# Patient Record
Sex: FEMALE | Race: WHITE | NOT HISPANIC OR LATINO | URBAN - METROPOLITAN AREA
[De-identification: names, ages, dates, MRNs, and addresses within clinical notes are randomized per-mention and may not be internally consistent; named-entity substitution may affect disease eponyms.]

---

## 2017-04-12 ENCOUNTER — OUTPATIENT (OUTPATIENT)
Dept: OUTPATIENT SERVICES | Facility: HOSPITAL | Age: 67
LOS: 1 days | End: 2017-04-12
Payer: MEDICARE

## 2017-04-12 ENCOUNTER — APPOINTMENT (OUTPATIENT)
Dept: RADIOLOGY | Facility: CLINIC | Age: 67
End: 2017-04-12

## 2017-04-12 DIAGNOSIS — Z00.8 ENCOUNTER FOR OTHER GENERAL EXAMINATION: ICD-10-CM

## 2017-04-12 PROBLEM — Z00.00 ENCOUNTER FOR PREVENTIVE HEALTH EXAMINATION: Status: ACTIVE | Noted: 2017-04-12

## 2017-04-12 PROCEDURE — 73502 X-RAY EXAM HIP UNI 2-3 VIEWS: CPT

## 2017-06-08 ENCOUNTER — APPOINTMENT (OUTPATIENT)
Dept: RADIOLOGY | Facility: CLINIC | Age: 67
End: 2017-06-08

## 2017-06-08 ENCOUNTER — OUTPATIENT (OUTPATIENT)
Dept: OUTPATIENT SERVICES | Facility: HOSPITAL | Age: 67
LOS: 1 days | End: 2017-06-08
Payer: MEDICARE

## 2017-06-08 DIAGNOSIS — Z00.8 ENCOUNTER FOR OTHER GENERAL EXAMINATION: ICD-10-CM

## 2017-06-08 PROCEDURE — 71046 X-RAY EXAM CHEST 2 VIEWS: CPT

## 2017-09-27 ENCOUNTER — INPATIENT (INPATIENT)
Facility: HOSPITAL | Age: 67
LOS: 15 days | Discharge: HOME HEALTH | DRG: 949 | End: 2017-10-13
Attending: STUDENT IN AN ORGANIZED HEALTH CARE EDUCATION/TRAINING PROGRAM | Admitting: STUDENT IN AN ORGANIZED HEALTH CARE EDUCATION/TRAINING PROGRAM
Payer: MEDICARE

## 2017-09-27 DIAGNOSIS — M79.2 NEURALGIA AND NEURITIS, UNSPECIFIED: ICD-10-CM

## 2017-09-27 DIAGNOSIS — Z91.81 HISTORY OF FALLING: ICD-10-CM

## 2017-09-27 DIAGNOSIS — B37.0 CANDIDAL STOMATITIS: ICD-10-CM

## 2017-09-27 DIAGNOSIS — F41.8 OTHER SPECIFIED ANXIETY DISORDERS: ICD-10-CM

## 2017-09-27 DIAGNOSIS — L08.9 LOCAL INFECTION OF THE SKIN AND SUBCUTANEOUS TISSUE, UNSPECIFIED: ICD-10-CM

## 2017-09-27 DIAGNOSIS — I82.421 ACUTE EMBOLISM AND THROMBOSIS OF RIGHT ILIAC VEIN: ICD-10-CM

## 2017-09-27 DIAGNOSIS — M81.0 AGE-RELATED OSTEOPOROSIS WITHOUT CURRENT PATHOLOGICAL FRACTURE: ICD-10-CM

## 2017-09-27 DIAGNOSIS — R00.0 TACHYCARDIA, UNSPECIFIED: ICD-10-CM

## 2017-09-27 DIAGNOSIS — R26.9 UNSPECIFIED ABNORMALITIES OF GAIT AND MOBILITY: ICD-10-CM
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
## 2017-09-27 DIAGNOSIS — Z47.89 ENCOUNTER FOR OTHER ORTHOPEDIC AFTERCARE: ICD-10-CM

## 2017-09-27 DIAGNOSIS — I82.412 ACUTE EMBOLISM AND THROMBOSIS OF LEFT FEMORAL VEIN: ICD-10-CM

## 2017-09-27 DIAGNOSIS — M21.371 FOOT DROP, RIGHT FOOT: ICD-10-CM

## 2017-09-27 DIAGNOSIS — Z51.89 ENCOUNTER FOR OTHER SPECIFIED AFTERCARE: ICD-10-CM

## 2017-09-27 DIAGNOSIS — E78.5 HYPERLIPIDEMIA, UNSPECIFIED: ICD-10-CM

## 2017-09-27 DIAGNOSIS — Z98.1 ARTHRODESIS STATUS: ICD-10-CM

## 2017-09-27 DIAGNOSIS — T81.31XD DISRUPTION OF EXTERNAL OPERATION (SURGICAL) WOUND, NOT ELSEWHERE CLASSIFIED, SUBSEQUENT ENCOUNTER: ICD-10-CM

## 2017-09-27 DIAGNOSIS — Z79.899 OTHER LONG TERM (CURRENT) DRUG THERAPY: ICD-10-CM

## 2017-09-27 DIAGNOSIS — Z86.718 PERSONAL HISTORY OF OTHER VENOUS THROMBOSIS AND EMBOLISM: ICD-10-CM

## 2017-09-27 DIAGNOSIS — Z94.1 HEART TRANSPLANT STATUS: ICD-10-CM

## 2017-09-27 DIAGNOSIS — B96.89 OTHER SPECIFIED BACTERIAL AGENTS AS THE CAUSE OF DISEASES CLASSIFIED ELSEWHERE: ICD-10-CM
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
## 2017-09-27 DIAGNOSIS — D64.9 ANEMIA, UNSPECIFIED: ICD-10-CM

## 2017-09-27 DIAGNOSIS — Z87.891 PERSONAL HISTORY OF NICOTINE DEPENDENCE: ICD-10-CM

## 2017-09-27 DIAGNOSIS — M21.372 FOOT DROP, LEFT FOOT: ICD-10-CM

## 2017-09-28 PROCEDURE — 93010 ELECTROCARDIOGRAM REPORT: CPT

## 2017-09-28 PROCEDURE — 99223 1ST HOSP IP/OBS HIGH 75: CPT

## 2017-09-29 PROCEDURE — 99233 SBSQ HOSP IP/OBS HIGH 50: CPT

## 2017-09-29 PROCEDURE — 99222 1ST HOSP IP/OBS MODERATE 55: CPT

## 2017-09-30 PROCEDURE — 99232 SBSQ HOSP IP/OBS MODERATE 35: CPT

## 2017-10-01 PROCEDURE — 99232 SBSQ HOSP IP/OBS MODERATE 35: CPT

## 2017-10-02 PROCEDURE — 99233 SBSQ HOSP IP/OBS HIGH 50: CPT

## 2017-10-03 PROCEDURE — 99232 SBSQ HOSP IP/OBS MODERATE 35: CPT

## 2017-10-03 PROCEDURE — 99233 SBSQ HOSP IP/OBS HIGH 50: CPT

## 2017-10-04 PROCEDURE — 99232 SBSQ HOSP IP/OBS MODERATE 35: CPT

## 2017-10-05 PROCEDURE — 99233 SBSQ HOSP IP/OBS HIGH 50: CPT

## 2017-10-05 PROCEDURE — 99232 SBSQ HOSP IP/OBS MODERATE 35: CPT

## 2017-10-06 PROCEDURE — 99232 SBSQ HOSP IP/OBS MODERATE 35: CPT

## 2017-10-07 PROCEDURE — 99232 SBSQ HOSP IP/OBS MODERATE 35: CPT | Mod: GC

## 2017-10-08 PROCEDURE — 99232 SBSQ HOSP IP/OBS MODERATE 35: CPT | Mod: GC

## 2017-10-09 PROCEDURE — 93970 EXTREMITY STUDY: CPT | Mod: 26

## 2017-10-09 PROCEDURE — 99233 SBSQ HOSP IP/OBS HIGH 50: CPT

## 2017-10-09 PROCEDURE — 99232 SBSQ HOSP IP/OBS MODERATE 35: CPT

## 2017-10-10 PROCEDURE — 99233 SBSQ HOSP IP/OBS HIGH 50: CPT

## 2017-10-10 PROCEDURE — 99232 SBSQ HOSP IP/OBS MODERATE 35: CPT

## 2017-10-11 PROCEDURE — 99232 SBSQ HOSP IP/OBS MODERATE 35: CPT

## 2017-10-11 PROCEDURE — 72100 X-RAY EXAM L-S SPINE 2/3 VWS: CPT | Mod: 26

## 2017-10-11 PROCEDURE — 99233 SBSQ HOSP IP/OBS HIGH 50: CPT

## 2017-10-12 PROCEDURE — 99233 SBSQ HOSP IP/OBS HIGH 50: CPT

## 2017-10-12 PROCEDURE — 99232 SBSQ HOSP IP/OBS MODERATE 35: CPT

## 2017-10-13 PROCEDURE — 99233 SBSQ HOSP IP/OBS HIGH 50: CPT

## 2017-10-13 PROCEDURE — 99238 HOSP IP/OBS DSCHRG MGMT 30/<: CPT

## 2017-12-19 PROCEDURE — 97116 GAIT TRAINING THERAPY: CPT

## 2017-12-19 PROCEDURE — 80053 COMPREHEN METABOLIC PANEL: CPT

## 2017-12-19 PROCEDURE — 84100 ASSAY OF PHOSPHORUS: CPT

## 2017-12-19 PROCEDURE — 93005 ELECTROCARDIOGRAM TRACING: CPT

## 2017-12-19 PROCEDURE — 80048 BASIC METABOLIC PNL TOTAL CA: CPT

## 2017-12-19 PROCEDURE — 80069 RENAL FUNCTION PANEL: CPT

## 2017-12-19 PROCEDURE — 87070 CULTURE OTHR SPECIMN AEROBIC: CPT

## 2017-12-19 PROCEDURE — 85027 COMPLETE CBC AUTOMATED: CPT

## 2017-12-19 PROCEDURE — 97163 PT EVAL HIGH COMPLEX 45 MIN: CPT

## 2017-12-19 PROCEDURE — 97530 THERAPEUTIC ACTIVITIES: CPT

## 2017-12-19 PROCEDURE — 97535 SELF CARE MNGMENT TRAINING: CPT

## 2017-12-19 PROCEDURE — 85025 COMPLETE CBC W/AUTO DIFF WBC: CPT

## 2017-12-19 PROCEDURE — 80197 ASSAY OF TACROLIMUS: CPT

## 2017-12-19 PROCEDURE — 83735 ASSAY OF MAGNESIUM: CPT

## 2017-12-19 PROCEDURE — 72100 X-RAY EXAM L-S SPINE 2/3 VWS: CPT

## 2017-12-19 PROCEDURE — 97167 OT EVAL HIGH COMPLEX 60 MIN: CPT

## 2017-12-19 PROCEDURE — 93970 EXTREMITY STUDY: CPT

## 2017-12-19 PROCEDURE — 97110 THERAPEUTIC EXERCISES: CPT

## 2017-12-19 PROCEDURE — 94760 N-INVAS EAR/PLS OXIMETRY 1: CPT

## 2018-12-14 ENCOUNTER — OUTPATIENT (OUTPATIENT)
Dept: OUTPATIENT SERVICES | Facility: HOSPITAL | Age: 68
LOS: 1 days | End: 2018-12-14
Payer: MEDICARE

## 2018-12-14 ENCOUNTER — APPOINTMENT (OUTPATIENT)
Dept: MRI IMAGING | Facility: CLINIC | Age: 68
End: 2018-12-14
Payer: MEDICARE

## 2018-12-14 DIAGNOSIS — M23.92 UNSPECIFIED INTERNAL DERANGEMENT OF LEFT KNEE: ICD-10-CM

## 2018-12-14 PROCEDURE — 73721 MRI JNT OF LWR EXTRE W/O DYE: CPT | Mod: 26,LT

## 2018-12-14 PROCEDURE — 73721 MRI JNT OF LWR EXTRE W/O DYE: CPT

## 2019-04-24 ENCOUNTER — OUTPATIENT (OUTPATIENT)
Dept: OUTPATIENT SERVICES | Facility: HOSPITAL | Age: 69
LOS: 1 days | End: 2019-04-24
Payer: MEDICARE

## 2019-04-24 ENCOUNTER — TRANSCRIPTION ENCOUNTER (OUTPATIENT)
Age: 69
End: 2019-04-24

## 2019-04-24 ENCOUNTER — APPOINTMENT (OUTPATIENT)
Dept: ULTRASOUND IMAGING | Facility: CLINIC | Age: 69
End: 2019-04-24
Payer: MEDICARE

## 2019-04-24 DIAGNOSIS — M25.572 PAIN IN LEFT ANKLE AND JOINTS OF LEFT FOOT: ICD-10-CM

## 2019-04-24 PROCEDURE — 93971 EXTREMITY STUDY: CPT | Mod: 26,LT

## 2019-04-24 PROCEDURE — 93971 EXTREMITY STUDY: CPT

## 2019-11-27 ENCOUNTER — TRANSCRIPTION ENCOUNTER (OUTPATIENT)
Age: 69
End: 2019-11-27

## 2020-11-23 ENCOUNTER — TRANSCRIPTION ENCOUNTER (OUTPATIENT)
Age: 70
End: 2020-11-23

## 2020-12-11 ENCOUNTER — TRANSCRIPTION ENCOUNTER (OUTPATIENT)
Age: 70
End: 2020-12-11

## 2022-10-25 ENCOUNTER — APPOINTMENT (OUTPATIENT)
Dept: ORTHOPEDIC SURGERY | Facility: CLINIC | Age: 72
End: 2022-10-25

## 2022-10-25 DIAGNOSIS — M16.11 UNILATERAL PRIMARY OSTEOARTHRITIS, RIGHT HIP: ICD-10-CM

## 2022-10-25 DIAGNOSIS — M54.9 DORSALGIA, UNSPECIFIED: ICD-10-CM

## 2022-10-25 DIAGNOSIS — M70.61 TROCHANTERIC BURSITIS, RIGHT HIP: ICD-10-CM

## 2022-10-25 DIAGNOSIS — Z98.1 ARTHRODESIS STATUS: ICD-10-CM

## 2022-10-25 PROCEDURE — 99214 OFFICE O/P EST MOD 30 MIN: CPT

## 2022-10-25 PROCEDURE — 99072 ADDL SUPL MATRL&STAF TM PHE: CPT

## 2022-10-25 PROCEDURE — 72100 X-RAY EXAM L-S SPINE 2/3 VWS: CPT

## 2022-10-25 PROCEDURE — 73502 X-RAY EXAM HIP UNI 2-3 VIEWS: CPT | Mod: RT

## 2022-10-25 RX ORDER — METHYLPREDNISOLONE 4 MG/1
4 TABLET ORAL
Qty: 1 | Refills: 1 | Status: ACTIVE | COMMUNITY
Start: 2022-10-25 | End: 1900-01-01

## 2022-10-25 NOTE — HISTORY OF PRESENT ILLNESS
[Right Leg] : right leg [Gradual] : gradual [Sudden] : sudden [6] : 6 [5] : 5 [Burning] : burning [Shooting] : shooting [Stabbing] : stabbing [Intermittent] : intermittent [Rest] : rest [Exercising] : exercising [Retired] : Work status: retired [de-identified] : 10/25/22 Return visit for this 71 year female c/o spon. onset of rt lateral hip pain x last 4 days duration. Limping. Constant and daily. has been using a shilo walker due to her pain. No hx of trauma. Tried tylenol prn w/o relief. Cannot take nsaids.\par \par PMH: HEART [employer]ANSPL[employer] PATIENT x 22 years ago.  Was on high dose prednisone daily for 2-3 years.  40 mg daily.\par          Also previous back surgery with hardware.  [] : Post Surgical Visit: no [FreeTextEntry1] : right hip  [FreeTextEntry5] : Pt has been having right hip pain that shoots down the leg, pt had been seen for her hip and was told she needed surgery and she hads an appointment for a surgical consultation  [de-identified] : none

## 2022-10-25 NOTE — PHYSICAL EXAM
[Normal Mood and Affect] : normal mood and affect [Able to Communicate] : able to communicate [Well Developed] : well developed [Well Nourished] : well nourished [Extension] : extension [Right] : right hip with pelvis [AP] : anteroposterior [Lateral] : lateral [Severe arthritis (Tonnis Grade 3)] : Severe arthritis (Tonnis Grade 3) [Fusion intact] : Fusion intact [FreeTextEntry9] : Rotating the right hip causes pain.  [FreeTextEntry1] : Lumbar fusion pedicle screws L3-S1, interbody cage L4-S1.  Intact. [de-identified] : extension 20 degrees [] : no pain with flexion and external rotation [de-identified] : Very pronounced antalgic gait. [TWNoteComboBox7] : flexion 100 degrees [de-identified] : adduction 20 degrees [de-identified] : abduction 20 degrees [de-identified] : external rotation 30 degrees [TWNoteComboBox6] : internal rotation 0 degrees

## 2023-12-30 ENCOUNTER — NON-APPOINTMENT (OUTPATIENT)
Age: 73
End: 2023-12-30

## 2024-03-01 ENCOUNTER — INPATIENT (INPATIENT)
Facility: HOSPITAL | Age: 74
LOS: 4 days | Discharge: HOME | DRG: 392 | End: 2024-03-06
Attending: INTERNAL MEDICINE | Admitting: INTERNAL MEDICINE
Payer: MEDICARE

## 2024-03-01 VITALS
WEIGHT: 199.96 LBS | HEART RATE: 109 BPM | DIASTOLIC BLOOD PRESSURE: 80 MMHG | HEIGHT: 65 IN | TEMPERATURE: 99 F | SYSTOLIC BLOOD PRESSURE: 126 MMHG | RESPIRATION RATE: 16 BRPM | OXYGEN SATURATION: 98 %

## 2024-03-01 DIAGNOSIS — Z94.1 HEART TRANSPLANT STATUS: Chronic | ICD-10-CM

## 2024-03-01 DIAGNOSIS — Z98.1 ARTHRODESIS STATUS: Chronic | ICD-10-CM

## 2024-03-01 LAB
ALBUMIN SERPL ELPH-MCNC: 2.8 G/DL — LOW (ref 3.3–5)
ALP SERPL-CCNC: 61 U/L — SIGNIFICANT CHANGE UP (ref 30–120)
ALT FLD-CCNC: 15 U/L — SIGNIFICANT CHANGE UP (ref 10–60)
ANION GAP SERPL CALC-SCNC: 8 MMOL/L — SIGNIFICANT CHANGE UP (ref 5–17)
APPEARANCE UR: CLEAR — SIGNIFICANT CHANGE UP
AST SERPL-CCNC: 20 U/L — SIGNIFICANT CHANGE UP (ref 10–40)
BASOPHILS # BLD AUTO: 0.04 K/UL — SIGNIFICANT CHANGE UP (ref 0–0.2)
BASOPHILS NFR BLD AUTO: 0.3 % — SIGNIFICANT CHANGE UP (ref 0–2)
BILIRUB SERPL-MCNC: 0.7 MG/DL — SIGNIFICANT CHANGE UP (ref 0.2–1.2)
BILIRUB UR-MCNC: NEGATIVE — SIGNIFICANT CHANGE UP
BUN SERPL-MCNC: 19 MG/DL — SIGNIFICANT CHANGE UP (ref 7–23)
CALCIUM SERPL-MCNC: 9.3 MG/DL — SIGNIFICANT CHANGE UP (ref 8.4–10.5)
CHLORIDE SERPL-SCNC: 96 MMOL/L — SIGNIFICANT CHANGE UP (ref 96–108)
CO2 SERPL-SCNC: 28 MMOL/L — SIGNIFICANT CHANGE UP (ref 22–31)
COLOR SPEC: YELLOW — SIGNIFICANT CHANGE UP
CREAT SERPL-MCNC: 1.19 MG/DL — SIGNIFICANT CHANGE UP (ref 0.5–1.3)
DIFF PNL FLD: NEGATIVE — SIGNIFICANT CHANGE UP
EGFR: 48 ML/MIN/1.73M2 — LOW
EOSINOPHIL # BLD AUTO: 0.06 K/UL — SIGNIFICANT CHANGE UP (ref 0–0.5)
EOSINOPHIL NFR BLD AUTO: 0.4 % — SIGNIFICANT CHANGE UP (ref 0–6)
GLUCOSE SERPL-MCNC: 127 MG/DL — HIGH (ref 70–99)
GLUCOSE UR QL: NEGATIVE MG/DL — SIGNIFICANT CHANGE UP
HCT VFR BLD CALC: 33.5 % — LOW (ref 34.5–45)
HGB BLD-MCNC: 10.8 G/DL — LOW (ref 11.5–15.5)
IMM GRANULOCYTES NFR BLD AUTO: 1.3 % — HIGH (ref 0–0.9)
KETONES UR-MCNC: NEGATIVE MG/DL — SIGNIFICANT CHANGE UP
LEUKOCYTE ESTERASE UR-ACNC: NEGATIVE — SIGNIFICANT CHANGE UP
LIDOCAIN IGE QN: 13 U/L — LOW (ref 16–77)
LYMPHOCYTES # BLD AUTO: 0.71 K/UL — LOW (ref 1–3.3)
LYMPHOCYTES # BLD AUTO: 4.7 % — LOW (ref 13–44)
MCHC RBC-ENTMCNC: 28.6 PG — SIGNIFICANT CHANGE UP (ref 27–34)
MCHC RBC-ENTMCNC: 32.2 GM/DL — SIGNIFICANT CHANGE UP (ref 32–36)
MCV RBC AUTO: 88.9 FL — SIGNIFICANT CHANGE UP (ref 80–100)
MONOCYTES # BLD AUTO: 1.05 K/UL — HIGH (ref 0–0.9)
MONOCYTES NFR BLD AUTO: 6.9 % — SIGNIFICANT CHANGE UP (ref 2–14)
NEUTROPHILS # BLD AUTO: 13.09 K/UL — HIGH (ref 1.8–7.4)
NEUTROPHILS NFR BLD AUTO: 86.4 % — HIGH (ref 43–77)
NITRITE UR-MCNC: NEGATIVE — SIGNIFICANT CHANGE UP
NRBC # BLD: 0 /100 WBCS — SIGNIFICANT CHANGE UP (ref 0–0)
PH UR: 7 — SIGNIFICANT CHANGE UP (ref 5–8)
PLATELET # BLD AUTO: 309 K/UL — SIGNIFICANT CHANGE UP (ref 150–400)
POTASSIUM SERPL-MCNC: 4.7 MMOL/L — SIGNIFICANT CHANGE UP (ref 3.5–5.3)
POTASSIUM SERPL-SCNC: 4.7 MMOL/L — SIGNIFICANT CHANGE UP (ref 3.5–5.3)
PROT SERPL-MCNC: 6.6 G/DL — SIGNIFICANT CHANGE UP (ref 6–8.3)
PROT UR-MCNC: NEGATIVE MG/DL — SIGNIFICANT CHANGE UP
RBC # BLD: 3.77 M/UL — LOW (ref 3.8–5.2)
RBC # FLD: 16.4 % — HIGH (ref 10.3–14.5)
SODIUM SERPL-SCNC: 132 MMOL/L — LOW (ref 135–145)
SP GR SPEC: 1.01 — SIGNIFICANT CHANGE UP (ref 1–1.03)
UROBILINOGEN FLD QL: 0.2 MG/DL — SIGNIFICANT CHANGE UP (ref 0.2–1)
WBC # BLD: 15.15 K/UL — HIGH (ref 3.8–10.5)
WBC # FLD AUTO: 15.15 K/UL — HIGH (ref 3.8–10.5)

## 2024-03-01 PROCEDURE — 99285 EMERGENCY DEPT VISIT HI MDM: CPT

## 2024-03-01 PROCEDURE — 74177 CT ABD & PELVIS W/CONTRAST: CPT | Mod: 26,MC

## 2024-03-01 RX ORDER — SODIUM CHLORIDE 9 MG/ML
1000 INJECTION INTRAMUSCULAR; INTRAVENOUS; SUBCUTANEOUS ONCE
Refills: 0 | Status: COMPLETED | OUTPATIENT
Start: 2024-03-01 | End: 2024-03-01

## 2024-03-01 RX ORDER — MORPHINE SULFATE 50 MG/1
4 CAPSULE, EXTENDED RELEASE ORAL ONCE
Refills: 0 | Status: DISCONTINUED | OUTPATIENT
Start: 2024-03-01 | End: 2024-03-01

## 2024-03-01 RX ORDER — ONDANSETRON 8 MG/1
4 TABLET, FILM COATED ORAL ONCE
Refills: 0 | Status: COMPLETED | OUTPATIENT
Start: 2024-03-01 | End: 2024-03-01

## 2024-03-01 RX ORDER — IOHEXOL 300 MG/ML
30 INJECTION, SOLUTION INTRAVENOUS ONCE
Refills: 0 | Status: COMPLETED | OUTPATIENT
Start: 2024-03-01 | End: 2024-03-01

## 2024-03-01 RX ORDER — ACETAMINOPHEN 500 MG
1000 TABLET ORAL ONCE
Refills: 0 | Status: COMPLETED | OUTPATIENT
Start: 2024-03-01 | End: 2024-03-01

## 2024-03-01 RX ADMIN — Medication 400 MILLIGRAM(S): at 19:37

## 2024-03-01 RX ADMIN — Medication 1000 MILLIGRAM(S): at 20:00

## 2024-03-01 RX ADMIN — ONDANSETRON 4 MILLIGRAM(S): 8 TABLET, FILM COATED ORAL at 21:35

## 2024-03-01 RX ADMIN — Medication 1000 MILLIGRAM(S): at 20:45

## 2024-03-01 RX ADMIN — IOHEXOL 30 MILLILITER(S): 300 INJECTION, SOLUTION INTRAVENOUS at 19:48

## 2024-03-01 RX ADMIN — MORPHINE SULFATE 4 MILLIGRAM(S): 50 CAPSULE, EXTENDED RELEASE ORAL at 22:15

## 2024-03-01 RX ADMIN — SODIUM CHLORIDE 1000 MILLILITER(S): 9 INJECTION INTRAMUSCULAR; INTRAVENOUS; SUBCUTANEOUS at 21:43

## 2024-03-01 RX ADMIN — MORPHINE SULFATE 4 MILLIGRAM(S): 50 CAPSULE, EXTENDED RELEASE ORAL at 21:35

## 2024-03-01 RX ADMIN — SODIUM CHLORIDE 1000 MILLILITER(S): 9 INJECTION INTRAMUSCULAR; INTRAVENOUS; SUBCUTANEOUS at 19:37

## 2024-03-01 NOTE — ED ADULT TRIAGE NOTE - CHIEF COMPLAINT QUOTE
" I had back surgery ( T 11)  done at the [facility] 10 days ago . I have not move my bowel since then "  (+) lower abdominal pain

## 2024-03-01 NOTE — ED ADULT NURSE NOTE - CHIEF COMPLAINT QUOTE
" I had back surgery ( T 11)  done at the [facility] 10 days ago . I have not move my bowel since then "  (+) lower abdominal pain

## 2024-03-01 NOTE — ED ADULT NURSE NOTE - NSFALLRISKINTERV_ED_ALL_ED

## 2024-03-01 NOTE — ED ADULT NURSE REASSESSMENT NOTE - NSFALLHARMRISKINTERV_ED_ALL_ED

## 2024-03-01 NOTE — ED PROVIDER NOTE - OBJECTIVE STATEMENT
Patient complaining of left-sided abdominal pain and constipation.  Patient relates [facility] February [facility], bowel movement since the surgery.  Patient feels her abdomen is distended.  Patient relates she has been taking tramadol and taking oxycodone and tramadol for pain since her discharge.  No fevers nausea vomiting urinary complaints.

## 2024-03-01 NOTE — ED PROVIDER NOTE - CLINICAL SUMMARY MEDICAL DECISION MAKING FREE TEXT BOX
Patient complaining of left-sided abdominal pain and constipation.  Patient [facility] February [facility], bowel movement since the surgery.  Patient feels her abdomen is distended.  Patient relates she has been taking tramadol and taking oxycodone and tramadol for pain since her discharge.  No fevers nausea vomiting urinary complaints.    Plan Labs CT abdomen pelvis with p.o. IV contrast IV fluids Ofirmev    Differential including but not limited to pancreatitis colitis diverticulitis enteritis SBO constipation

## 2024-03-01 NOTE — ED PROVIDER NOTE - DIFFERENTIAL DIAGNOSIS
Differential including but not limited to pancreatitis colitis diverticulitis enteritis SBO constipation Differential Diagnosis

## 2024-03-02 DIAGNOSIS — K59.00 CONSTIPATION, UNSPECIFIED: ICD-10-CM

## 2024-03-02 LAB — LACTATE SERPL-SCNC: 0.9 MMOL/L — SIGNIFICANT CHANGE UP (ref 0.7–2)

## 2024-03-02 PROCEDURE — 99222 1ST HOSP IP/OBS MODERATE 55: CPT

## 2024-03-02 PROCEDURE — 93010 ELECTROCARDIOGRAM REPORT: CPT

## 2024-03-02 RX ORDER — TACROLIMUS 5 MG/1
2 CAPSULE ORAL AT BEDTIME
Refills: 0 | Status: DISCONTINUED | OUTPATIENT
Start: 2024-03-02 | End: 2024-03-06

## 2024-03-02 RX ORDER — SENNA PLUS 8.6 MG/1
2 TABLET ORAL
Refills: 0 | DISCHARGE

## 2024-03-02 RX ORDER — METOPROLOL TARTRATE 50 MG
25 TABLET ORAL DAILY
Refills: 0 | Status: DISCONTINUED | OUTPATIENT
Start: 2024-03-02 | End: 2024-03-06

## 2024-03-02 RX ORDER — ALPRAZOLAM 0.25 MG
0.25 TABLET ORAL DAILY
Refills: 0 | Status: DISCONTINUED | OUTPATIENT
Start: 2024-03-02 | End: 2024-03-06

## 2024-03-02 RX ORDER — ONDANSETRON 8 MG/1
4 TABLET, FILM COATED ORAL EVERY 8 HOURS
Refills: 0 | Status: DISCONTINUED | OUTPATIENT
Start: 2024-03-02 | End: 2024-03-06

## 2024-03-02 RX ORDER — SENNA PLUS 8.6 MG/1
2 TABLET ORAL AT BEDTIME
Refills: 0 | Status: DISCONTINUED | OUTPATIENT
Start: 2024-03-02 | End: 2024-03-06

## 2024-03-02 RX ORDER — TACROLIMUS 5 MG/1
0.5 CAPSULE ORAL
Refills: 0 | Status: DISCONTINUED | OUTPATIENT
Start: 2024-03-02 | End: 2024-03-02

## 2024-03-02 RX ORDER — TRAMADOL HYDROCHLORIDE 50 MG/1
1 TABLET ORAL
Refills: 0 | DISCHARGE

## 2024-03-02 RX ORDER — ATORVASTATIN CALCIUM 80 MG/1
1 TABLET, FILM COATED ORAL
Refills: 0 | DISCHARGE

## 2024-03-02 RX ORDER — MYCOPHENOLATE MOFETIL 250 MG/1
500 CAPSULE ORAL
Refills: 0 | Status: DISCONTINUED | OUTPATIENT
Start: 2024-03-02 | End: 2024-03-06

## 2024-03-02 RX ORDER — ATORVASTATIN CALCIUM 80 MG/1
20 TABLET, FILM COATED ORAL AT BEDTIME
Refills: 0 | Status: DISCONTINUED | OUTPATIENT
Start: 2024-03-02 | End: 2024-03-06

## 2024-03-02 RX ORDER — SOD SULF/SODIUM/NAHCO3/KCL/PEG
4000 SOLUTION, RECONSTITUTED, ORAL ORAL ONCE
Refills: 0 | Status: COMPLETED | OUTPATIENT
Start: 2024-03-02 | End: 2024-03-02

## 2024-03-02 RX ORDER — TACROLIMUS 5 MG/1
2 CAPSULE ORAL
Refills: 0 | DISCHARGE

## 2024-03-02 RX ORDER — GABAPENTIN 400 MG/1
1 CAPSULE ORAL
Refills: 0 | DISCHARGE

## 2024-03-02 RX ORDER — HEPARIN SODIUM 5000 [USP'U]/ML
5000 INJECTION INTRAVENOUS; SUBCUTANEOUS EVERY 8 HOURS
Refills: 0 | Status: DISCONTINUED | OUTPATIENT
Start: 2024-03-02 | End: 2024-03-06

## 2024-03-02 RX ORDER — NALOXEGOL OXALATE 12.5 MG/1
25 TABLET, FILM COATED ORAL DAILY
Refills: 0 | Status: DISCONTINUED | OUTPATIENT
Start: 2024-03-02 | End: 2024-03-06

## 2024-03-02 RX ORDER — ASPIRIN/CALCIUM CARB/MAGNESIUM 324 MG
81 TABLET ORAL DAILY
Refills: 0 | Status: DISCONTINUED | OUTPATIENT
Start: 2024-03-02 | End: 2024-03-06

## 2024-03-02 RX ORDER — POLYETHYLENE GLYCOL 3350 17 G/17G
2 POWDER, FOR SOLUTION ORAL
Refills: 0 | DISCHARGE

## 2024-03-02 RX ORDER — TACROLIMUS 5 MG/1
1.5 CAPSULE ORAL
Refills: 0 | Status: DISCONTINUED | OUTPATIENT
Start: 2024-03-02 | End: 2024-03-06

## 2024-03-02 RX ORDER — PIPERACILLIN AND TAZOBACTAM 4; .5 G/20ML; G/20ML
3.38 INJECTION, POWDER, LYOPHILIZED, FOR SOLUTION INTRAVENOUS ONCE
Refills: 0 | Status: COMPLETED | OUTPATIENT
Start: 2024-03-02 | End: 2024-03-02

## 2024-03-02 RX ORDER — GABAPENTIN 400 MG/1
100 CAPSULE ORAL EVERY 12 HOURS
Refills: 0 | Status: DISCONTINUED | OUTPATIENT
Start: 2024-03-02 | End: 2024-03-06

## 2024-03-02 RX ORDER — MULTIVIT WITH MIN/MFOLATE/K2 340-15/3 G
296 POWDER (GRAM) ORAL ONCE
Refills: 0 | Status: COMPLETED | OUTPATIENT
Start: 2024-03-02 | End: 2024-03-02

## 2024-03-02 RX ORDER — MYCOPHENOLATE MOFETIL 250 MG/1
2 CAPSULE ORAL
Refills: 0 | DISCHARGE

## 2024-03-02 RX ORDER — SOD SULF/SODIUM/NAHCO3/KCL/PEG
2 SOLUTION, RECONSTITUTED, ORAL ORAL ONCE
Refills: 0 | Status: DISCONTINUED | OUTPATIENT
Start: 2024-03-02 | End: 2024-03-02

## 2024-03-02 RX ORDER — ASPIRIN/CALCIUM CARB/MAGNESIUM 324 MG
0 TABLET ORAL
Refills: 0 | DISCHARGE

## 2024-03-02 RX ORDER — SODIUM CHLORIDE 9 MG/ML
1000 INJECTION INTRAMUSCULAR; INTRAVENOUS; SUBCUTANEOUS
Refills: 0 | Status: DISCONTINUED | OUTPATIENT
Start: 2024-03-02 | End: 2024-03-03

## 2024-03-02 RX ORDER — ALPRAZOLAM 0.25 MG
1 TABLET ORAL
Refills: 0 | DISCHARGE

## 2024-03-02 RX ORDER — LANSOPRAZOLE 15 MG/1
1 CAPSULE, DELAYED RELEASE ORAL
Refills: 0 | DISCHARGE

## 2024-03-02 RX ORDER — LANOLIN ALCOHOL/MO/W.PET/CERES
3 CREAM (GRAM) TOPICAL AT BEDTIME
Refills: 0 | Status: DISCONTINUED | OUTPATIENT
Start: 2024-03-02 | End: 2024-03-06

## 2024-03-02 RX ORDER — PANTOPRAZOLE SODIUM 20 MG/1
40 TABLET, DELAYED RELEASE ORAL
Refills: 0 | Status: DISCONTINUED | OUTPATIENT
Start: 2024-03-02 | End: 2024-03-06

## 2024-03-02 RX ORDER — METHOCARBAMOL 500 MG/1
2 TABLET, FILM COATED ORAL
Refills: 0 | DISCHARGE

## 2024-03-02 RX ORDER — TACROLIMUS 5 MG/1
3 CAPSULE ORAL
Refills: 0 | DISCHARGE

## 2024-03-02 RX ORDER — DOCUSATE SODIUM 100 MG
1 CAPSULE ORAL
Refills: 0 | DISCHARGE

## 2024-03-02 RX ORDER — METOPROLOL TARTRATE 50 MG
1 TABLET ORAL
Refills: 0 | DISCHARGE

## 2024-03-02 RX ORDER — ACETAMINOPHEN 500 MG
650 TABLET ORAL EVERY 6 HOURS
Refills: 0 | Status: DISCONTINUED | OUTPATIENT
Start: 2024-03-02 | End: 2024-03-06

## 2024-03-02 RX ORDER — MYCOPHENOLATE MOFETIL 250 MG/1
0 CAPSULE ORAL
Refills: 0 | DISCHARGE

## 2024-03-02 RX ORDER — PIPERACILLIN AND TAZOBACTAM 4; .5 G/20ML; G/20ML
3.38 INJECTION, POWDER, LYOPHILIZED, FOR SOLUTION INTRAVENOUS EVERY 8 HOURS
Refills: 0 | Status: DISCONTINUED | OUTPATIENT
Start: 2024-03-02 | End: 2024-03-02

## 2024-03-02 RX ADMIN — Medication 3 MILLIGRAM(S): at 21:48

## 2024-03-02 RX ADMIN — HEPARIN SODIUM 5000 UNIT(S): 5000 INJECTION INTRAVENOUS; SUBCUTANEOUS at 21:47

## 2024-03-02 RX ADMIN — Medication 650 MILLIGRAM(S): at 21:48

## 2024-03-02 RX ADMIN — PIPERACILLIN AND TAZOBACTAM 25 GRAM(S): 4; .5 INJECTION, POWDER, LYOPHILIZED, FOR SOLUTION INTRAVENOUS at 13:18

## 2024-03-02 RX ADMIN — GABAPENTIN 100 MILLIGRAM(S): 400 CAPSULE ORAL at 18:08

## 2024-03-02 RX ADMIN — TACROLIMUS 2 MILLIGRAM(S): 5 CAPSULE ORAL at 21:45

## 2024-03-02 RX ADMIN — ATORVASTATIN CALCIUM 20 MILLIGRAM(S): 80 TABLET, FILM COATED ORAL at 21:48

## 2024-03-02 RX ADMIN — Medication 10 MILLIGRAM(S): at 13:18

## 2024-03-02 RX ADMIN — SODIUM CHLORIDE 72 MILLILITER(S): 9 INJECTION INTRAMUSCULAR; INTRAVENOUS; SUBCUTANEOUS at 06:07

## 2024-03-02 RX ADMIN — MYCOPHENOLATE MOFETIL 500 MILLIGRAM(S): 250 CAPSULE ORAL at 18:08

## 2024-03-02 RX ADMIN — TACROLIMUS 1.5 MILLIGRAM(S): 5 CAPSULE ORAL at 08:32

## 2024-03-02 RX ADMIN — Medication 650 MILLIGRAM(S): at 22:18

## 2024-03-02 RX ADMIN — MYCOPHENOLATE MOFETIL 500 MILLIGRAM(S): 250 CAPSULE ORAL at 06:07

## 2024-03-02 RX ADMIN — Medication 25 MILLIGRAM(S): at 06:07

## 2024-03-02 RX ADMIN — HEPARIN SODIUM 5000 UNIT(S): 5000 INJECTION INTRAVENOUS; SUBCUTANEOUS at 06:07

## 2024-03-02 RX ADMIN — Medication 296 MILLILITER(S): at 00:22

## 2024-03-02 RX ADMIN — PANTOPRAZOLE SODIUM 40 MILLIGRAM(S): 20 TABLET, DELAYED RELEASE ORAL at 06:08

## 2024-03-02 RX ADMIN — SENNA PLUS 2 TABLET(S): 8.6 TABLET ORAL at 21:48

## 2024-03-02 RX ADMIN — Medication 4000 MILLILITER(S): at 08:32

## 2024-03-02 RX ADMIN — HEPARIN SODIUM 5000 UNIT(S): 5000 INJECTION INTRAVENOUS; SUBCUTANEOUS at 13:18

## 2024-03-02 RX ADMIN — NALOXEGOL OXALATE 25 MILLIGRAM(S): 12.5 TABLET, FILM COATED ORAL at 15:02

## 2024-03-02 RX ADMIN — Medication 5 MILLIGRAM(S): at 18:08

## 2024-03-02 RX ADMIN — Medication 81 MILLIGRAM(S): at 13:18

## 2024-03-02 RX ADMIN — GABAPENTIN 100 MILLIGRAM(S): 400 CAPSULE ORAL at 06:08

## 2024-03-02 RX ADMIN — PIPERACILLIN AND TAZOBACTAM 200 GRAM(S): 4; .5 INJECTION, POWDER, LYOPHILIZED, FOR SOLUTION INTRAVENOUS at 04:55

## 2024-03-02 RX ADMIN — Medication 5 MILLIGRAM(S): at 06:07

## 2024-03-02 NOTE — CONSULT NOTE ADULT - ASSESSMENT
constipation    ct noted  large fecal load  will start movantik  stool burden is in right colon; enema will not help  start moviprep  will follow  constipation    ct noted  large fecal load  will start movantik  stool burden is in right colon; enema will not help  cont moviprep  + flatus today, abdomen soft and NT  d/w patient  will follow

## 2024-03-02 NOTE — CARE COORDINATION ASSESSMENT. - NSCAREPROVIDERS_GEN_ALL_CORE_FT
CARE PROVIDERS:  Accepting Physician: Ezekiel Schroeder  Administration: Adolfo Parnell  Administration: Lesa Snyder  Admitting: Ezekiel Schroeder  Attending: Ezekiel Schroeder  Case Management: Laura Amaya  Consultant: Simeon Kirby  ED Attending: Omid Pineda  ED Nurse: Emily Reid  Emergency Medicine: Monisha Gould  Nurse: Mia Nixon  Nurse: Alicia Ramirez  Nurse: Emily Reid  Nurse: Bridgett Mendoza  Nurse: Elena Browne  Nurse: Vandana Magallon  Override: Elena Browne  Override: Vandana Magallon  PCA/Nursing Assistant: Shakir Cummins  PCA/Nursing Assistant: Vanessa Egan  Primary Team: Yunier Banks  : Tasha Christie  Team: [facility] Hospitalists, Team  UR// Supp. Assoc.: Latisha Llanes

## 2024-03-02 NOTE — H&P ADULT - NSHPPHYSICALEXAM_GEN_ALL_CORE
T(C): 36.8 (03-02-24 @ 02:45), Max: 37.3 (03-01-24 @ 18:22)  HR: 102 (03-02-24 @ 02:45) (100 - 109)  BP: 139/82 (03-02-24 @ 02:45) (120/70 - 139/82)  RR: 16 (03-02-24 @ 02:45) (16 - 20)  SpO2: 96% (03-02-24 @ 02:45) (96% - 100%)    CONSTITUTIONAL: Well groomed, no apparent distress  EYES: PERRLA and symmetric, EOMI, No conjunctival or scleral injection, non-icteric  ENMT: Oral mucosa with moist membranes. Normal dentition; no pharyngeal injection or exudates  RESP: No respiratory distress, no use of accessory muscles; CTA b/l, no WRR  CV: RRR, +S1S2, no MRG; no JVD; no peripheral edema  GI: Soft, distended, hypoactive bowel sounds  SKIN: No rashes or ulcers noted; no subcutaneous nodules or induration palpable, spinal drain in placed   NEURO: CN II-XII intact; normal reflexes in upper and lower extremities, sensation intact in upper and lower extremities b/l to light touch   PSYCH: Appropriate insight/judgment; A+O x 3, mood and affect appropriate, recent/remote memory intact

## 2024-03-02 NOTE — PATIENT PROFILE ADULT - HOME ACCESSIBILITY CONCERNS
The patient is a 70-year-old black female who has a history that I think is at least suggestive of hidradenitis suppurativa  I have drained a right labial abscess on her in the past and she comes in today after noticing some swelling in the krishna rectal area  She started herself on warm soaks  She has not noticed any drainage  On physical examination this is a somewhat obese black female in no distress  Examination of the area in question shows a 1-1/2 cm thickening without fluctuance or palpable underlying mass  This is a couple cm lateral to the anal opening and would be at the 2 o'clock position with the posterior midline being the 12 o'clock position  This is not tender  There is nothing going anteriorly    I told her to take Sitz baths and take a week antibiotics and will go from there
none

## 2024-03-02 NOTE — CONSULT NOTE ADULT - SUBJECTIVE AND OBJECTIVE BOX
74 yo fem h/o heart transplant s/p spinal surgery 10 days ago, being constipated c/o lower abd pain, no emesis, passing flatus    "date of service"24 @ 16:43    HPI:  72 y/o female with PMH of heart transplant 23 years ago currently on cellcept and tacrolimus, anxiety, HTN, HLD, constipation, s/p spinal fusion sx at  [facility] on  discharged on  presented to ED to be evaluated for abdominal distention associated with constipation. pt states that since her surgery at [facility] she has not had any bowel movement. mentions that multiple regiments were tried on her at [facility] including emenas but she was discharged before having bowel movement. in last 48 hours pt have not been able to pass gas which prompted her to come to ED.    ED Course:  pt was given mag citrate but could not finish due to nausea  CT abd/pel: w/o SBO or bowel obstruction. showed large stool burden          (02 Mar 2024 04:31)      PAST MEDICAL & SURGICAL HISTORY:  H/O spinal fusion      H/O heart transplant          24 @ 16:43  REVIEW OF SYSTEMS:    CONSTITUTIONAL: No weakness, fevers or chills  EYES/ENT: No visual changes;  No vertigo or throat pain   NECK: No pain or stiffness  RESPIRATORY: No cough, wheezing, hemoptysis; No shortness of breath  CARDIOVASCULAR: No chest pain or palpitations  GASTROINTESTINAL: abdominal  pain. No nausea, vomiting, or hematemesis; No diarrhea or constipation. No melena or hematochezia.  GENITOURINARY: No dysuria, frequency or hematuria  NEUROLOGICAL: No numbness or weakness  SKIN: No itching, burning, rashes, or lesions   All other review of systems is negative unless indicated above.    MEDICATIONS  (STANDING):  aspirin  chewable 81 milliGRAM(s) Oral daily  atorvastatin 20 milliGRAM(s) Oral at bedtime  bisacodyl 5 milliGRAM(s) Oral every 12 hours  gabapentin 100 milliGRAM(s) Oral every 12 hours  heparin   Injectable 5000 Unit(s) SubCutaneous every 8 hours  metoprolol succinate ER 25 milliGRAM(s) Oral daily  mycophenolate mofetil 500 milliGRAM(s) Oral two times a day  naloxegol 25 milliGRAM(s) Oral daily  pantoprazole    Tablet 40 milliGRAM(s) Oral before breakfast  PARoxetine 10 milliGRAM(s) Oral daily  piperacillin/tazobactam IVPB.. 3.375 Gram(s) IV Intermittent every 8 hours  senna 2 Tablet(s) Oral at bedtime  sodium chloride 0.9%. 1000 milliLiter(s) (72 mL/Hr) IV Continuous <Continuous>  tacrolimus 2 milliGRAM(s) Oral at bedtime  tacrolimus 1.5 milliGRAM(s) Oral with breakfast    MEDICATIONS  (PRN):  acetaminophen     Tablet .. 650 milliGRAM(s) Oral every 6 hours PRN Temp greater or equal to 38C (100.4F), Mild Pain (1 - 3)  ALPRAZolam 0.25 milliGRAM(s) Oral daily PRN anxiety  aluminum hydroxide/magnesium hydroxide/simethicone Suspension 30 milliLiter(s) Oral every 4 hours PRN Dyspepsia  melatonin 3 milliGRAM(s) Oral at bedtime PRN Insomnia  ondansetron Injectable 4 milliGRAM(s) IV Push every 8 hours PRN Nausea and/or Vomiting      Allergies    No Known Allergies    Intolerances        SOCIAL HISTORY:    FAMILY HISTORY: non contributory      Vital Signs Last 24 Hrs  T(C): 37.1 (02 Mar 2024 14:36), Max: 37.3 (01 Mar 2024 18:22)  T(F): 98.7 (02 Mar 2024 14:36), Max: 99.1 (01 Mar 2024 18:22)  HR: 96 (02 Mar 2024 14:36) (96 - 109)  BP: 131/72 (02 Mar 2024 14:36) (120/70 - 139/82)  BP(mean): --  RR: 18 (02 Mar 2024 14:36) (16 - 20)  SpO2: 96% (02 Mar 2024 14:36) (95% - 100%)    Parameters below as of 02 Mar 2024 14:36  Patient On (Oxygen Delivery Method): room air        .24 @ 16:43  VITAL SIGNS:  T(C): 37.1 (24 @ 14:36), Max: 37.3 (24 @ 18:22)  T(F): 98.7 (24 @ 14:36), Max: 99.1 (24 @ 18:22)  HR: 96 (24 @ 14:36) (96 - 109)  BP: 131/72 (24 @ 14:36) (120/70 - 139/82)  BP(mean): --  RR: 18 (24 @ 14:36) (16 - 20)  SpO2: 96% (24 @ 14:36) (95% - 100%)  Wt(kg): --    PHYSICAL EXAM:    Constitutional: resting comfortably in bed; NAD  Eyes: PERRL, EOMI, anicteric sclera  ENT: no nasal discharge; uvula midline, no oropharyngeal erythema or exudates  Neck: supple; no JVD or thyromegaly  Respiratory: CTA B/L; no W/R/R, no retractions  Cardiac: +S1/S2; RRR; no murmurs, sternotomy scar  Gastrointestinal: soft, NT/ND;   Back: spine midline, no bony tenderness or step-offs; no CVAT B/L, back scar w drain  Extremities: no clubbing or cyanosis; no peripheral edema  Musculoskeletal: NROM x4; no joint swelling, tenderness or erythema  Vascular: 2+ radial, femoral, DP/PT pulses B/L  Dermatologic: skin warm, dry and intact; no rashes, wounds, or scars  Lymphatic: no submandibular or cervical LAD  Neurologic: AAOx3; CNII-XII grossly intact; no focal deficits  Psychiatric: affect and characteristics of appearance, verbalizations, behaviors are appropriate    LABS:                        10.8   15.15 )-----------( 309      ( 01 Mar 2024 18:50 )             33.5       -    132<L>  |  96  |  19  ----------------------------<  127<H>  4.7   |  28  |  1.19    Ca    9.3      01 Mar 2024 18:50    TPro  6.6  /  Alb  2.8<L>  /  TBili  0.7  /  DBili  x   /  AST  20  /  ALT  15  /  AlkPhos  61  03-          Urinalysis Basic - ( 01 Mar 2024 21:10 )    Color: Yellow / Appearance: Clear / S.008 / pH: x  Gluc: x / Ketone: Negative mg/dL  / Bili: Negative / Urobili: 0.2 mg/dL   Blood: x / Protein: Negative mg/dL / Nitrite: Negative   Leuk Esterase: Negative / RBC: x / WBC x   Sq Epi: x / Non Sq Epi: x / Bacteria: x        RADIOLOGY & ADDITIONAL STUDIES:  < from: CT Abdomen and Pelvis w/ Oral Cont and w/ IV Cont (24 @ 22:13) >    IMPRESSION:    A moderate amount of stool throughout the colon, especially right colon   and distal/terminal ileum. Dilated fecalized distal/terminal ileum with   mild surrounding stranding, which may be due to incompetent ileocecal   valve and reflux of cecal contents. No discrete transition to suggest   bowel obstruction.    Borderline dilated proximal/mid appendix tapering distally without   significant inflammatory change. Recommend clinical correlation.    Question striated bilateral nephrogram. Recommend clinical correlation to   assess urinary tract infection.    Postsurgical changes in the spine. Fluid collections with stranding in   the paraspinal soft tissue as described, with a drain in place.   Suboptimal evaluation of the spinal canal/neural foramina. Recommend   clinical correlation to assess infection. Recommend comparison to   previous outside study and follow-up (MR) as indicated.    Additional findings as described    < end of copied text >  
    HPI:  72YO F PMH of heart transplant 23 years ago on cellcept and tacrolimus, anxiety, HTN, HLD, constipation, s/p spinal fusion sx at  [facility] on 2/21 discharged on 2/26th presented to ED to be evaluated for abdominal distention/discomfort associated with constipation. Since surgery she has not had BMs and also on pain meds   CT abd/pel showed mod amount of stool burden. WBC 15K Denies fever chills n/v/d CP SOB urinary symptoms.     Infectious Disease consult was called to evaluate pt.        Past Medical & Surgical Hx:  PAST MEDICAL & SURGICAL HISTORY:  H/O spinal fusion  H/O heart transplant      Social History--  EtOH: denies   Tobacco: denies  Drug Use: denies    FAMILY HISTORY:  Noncontributory    Allergies  No Known Allergies    Intolerances  NONE    Home Medications:  ALPRAZolam 0.25 mg oral tablet: 1 tab(s) orally once a day (02 Mar 2024 01:59)  aspirin 81 mg oral delayed release capsule: orally once a day (02 Mar 2024 01:59)  atorvastatin 20 mg oral tablet: 1 tab(s) orally once a day (02 Mar 2024 01:59)  Cellcept: 250mg po 2 tabs (500mg) BID for a total of 1000mg in a day (02 Mar 2024 04:18)  CellCept 500 mg oral tablet: 2 tab(s) orally 2 times a day (02 Mar 2024 01:59)  Citracal + D 315 mg-6.25 mcg (250 intl units) oral tablet: 2 tab(s) orally every 12 hours (02 Mar 2024 01:59)  Colace 100 mg oral capsule: 1 cap(s) orally 3 times a day (02 Mar 2024 01:59)  gabapentin 100 mg oral capsule: 1 cap(s) orally every 12 hours (02 Mar 2024 01:59)  lansoprazole 15 mg oral delayed release capsule: 1 cap(s) orally once a day (02 Mar 2024 04:09)  methocarbamol 500 mg oral tablet: 2 tab(s) orally every 8 hours (02 Mar 2024 04:09)  Metoprolol Succinate ER 25 mg oral tablet, extended release: 1 tab(s) orally once a day (02 Mar 2024 04:09)  PARoxetine 10 mg oral tablet: 1 tab(s) orally once a day (02 Mar 2024 04:09)  polyethylene glycol 3350 oral kit: 2 2 times a day (02 Mar 2024 04:09)  senna (sennosides) 8.6 mg oral tablet: 2 tab(s) orally 2 times a day (02 Mar 2024 04:09)  tacrolimus 0.5 mg oral capsule: 3 cap(s) orally once a day (at bedtime) (02 Mar 2024 04:09)  tacrolimus 1 mg oral capsule: 2 cap(s) orally once a day in AM (02 Mar 2024 04:09)  traMADol 50 mg oral tablet: 1 tab(s) orally every 4 hours (02 Mar 2024 04:09)    Current Inpatient Medications :    ANTIBIOTICS:   piperacillin/tazobactam IVPB.. 3.375 Gram(s) IV Intermittent every 8 hours      OTHER RELEVANT MEDICATIONS :  acetaminophen     Tablet .. 650 milliGRAM(s) Oral every 6 hours PRN  ALPRAZolam 0.25 milliGRAM(s) Oral daily PRN  aluminum hydroxide/magnesium hydroxide/simethicone Suspension 30 milliLiter(s) Oral every 4 hours PRN  aspirin  chewable 81 milliGRAM(s) Oral daily  atorvastatin 20 milliGRAM(s) Oral at bedtime  bisacodyl 5 milliGRAM(s) Oral every 12 hours  gabapentin 100 milliGRAM(s) Oral every 12 hours  heparin   Injectable 5000 Unit(s) SubCutaneous every 8 hours  melatonin 3 milliGRAM(s) Oral at bedtime PRN  metoprolol succinate ER 25 milliGRAM(s) Oral daily  mycophenolate mofetil 500 milliGRAM(s) Oral two times a day  naloxegol 25 milliGRAM(s) Oral daily  ondansetron Injectable 4 milliGRAM(s) IV Push every 8 hours PRN  pantoprazole    Tablet 40 milliGRAM(s) Oral before breakfast  PARoxetine 10 milliGRAM(s) Oral daily  senna 2 Tablet(s) Oral at bedtime  sodium chloride 0.9%. 1000 milliLiter(s) IV Continuous <Continuous>  tacrolimus 2 milliGRAM(s) Oral at bedtime  tacrolimus 1.5 milliGRAM(s) Oral with breakfast      ROS:  CONSTITUTIONAL:  Negative fever or chills  EYES:  Negative  blurry vision or double vision  CARDIOVASCULAR:  Negative for chest pain or palpitations  RESPIRATORY:  Negative for cough, wheezing, or SOB   GASTROINTESTINAL:  Negative for nausea, vomiting, diarrhea, +constipation, abdominal pain  GENITOURINARY:  Negative frequency, urgency , dysuria or hematuria   NEUROLOGIC:  No headache, confusion, dizziness, lightheadedness  All other systems were reviewed and are negative      I&O's Detail    01 Mar 2024 07:01  -  02 Mar 2024 07:00  --------------------------------------------------------  IN:    sodium chloride 0.9%: 144 mL  Total IN: 144 mL    OUT:    Bulb (mL): 0 mL  Total OUT: 0 mL    Total NET: 144 mL      02 Mar 2024 07:01  -  02 Mar 2024 20:49  --------------------------------------------------------  IN:  Total IN: 0 mL    OUT:    Bulb (mL): 3 mL  Total OUT: 3 mL    Total NET: -3 mL          Physical Exam:  Vital Signs Last 24 Hrs  T(C): 37.1 (02 Mar 2024 14:36), Max: 37.1 (02 Mar 2024 14:36)  T(F): 98.7 (02 Mar 2024 14:36), Max: 98.7 (02 Mar 2024 14:36)  HR: 96 (02 Mar 2024 14:36) (96 - 105)  BP: 131/72 (02 Mar 2024 14:36) (120/70 - 139/82)  RR: 18 (02 Mar 2024 14:36) (16 - 18)  SpO2: 96% (02 Mar 2024 14:36) (95% - 100%)    Parameters below as of 02 Mar 2024 14:36  Patient On (Oxygen Delivery Method): room air      General: no acute distress  Neck: supple, trachea midline  Lungs: clear, no wheeze/rhonchi  Cardiovascular: regular rate and rhythm, S1 S2  Abdomen: soft, nontender, ND, bowel sounds normal  Neurological:  alert and oriented x3  Skin: no rash  Extremities: no edema    Labs:                         10.8   15.15 )-----------( 309      ( 01 Mar 2024 18:50 )             33.5   03-01    132<L>  |  96  |  19  ----------------------------<  127<H>  4.7   |  28  |  1.19    Ca    9.3      01 Mar 2024 18:50    TPro  6.6  /  Alb  2.8<L>  /  TBili  0.7  /  DBili  x   /  AST  20  /  ALT  15  /  AlkPhos  61  03-01    Urinalysis (03.01.24 @ 21:10)    Glucose Qualitative, Urine: Negative mg/dL   Blood, Urine: Negative   pH Urine: 7.0   Color: Yellow   Urine Appearance: Clear   Bilirubin: Negative   Ketone - Urine: Negative mg/dL   Specific Gravity: 1.008   Protein, Urine: Negative mg/dL   Urobilinogen: 0.2 mg/dL   Nitrite: Negative   Leukocyte Esterase Concentration: Negative      RECENT CULTURES:          RADIOLOGY & ADDITIONAL STUDIES:    ACC: [id number] EXAM:  CT ABDOMEN AND PELVIS OC IC   ORDERED BY: NOAH PRYOR     PROCEDURE DATE:  03/01/2024          INTERPRETATION:  CLINICAL INDICATION: abd pain, constipation, spinal   fusion and February.    PROCEDURE:  Helical axial images were obtained from the domes of the diaphragm   through the pubic symphysis following the administration of intravenous   contrast. Coronal and sagittal reformats were also obtained.    CONTRAST/COMPLICATIONS:  IV Contrast: Omnipaque 350  90 cc administered   10 cc discarded  Oral Contrast: Omnipaque 300  Complications: None reported at time of study completion    COMPARISON: None.    FINDINGS:    LOWER CHEST: Atelectasis.    LIVER: No obvious lesion.  BILE DUCTS/GALLBLADDER: No intrahepaticbiliary dilatation. Common bile   duct dilatation, reflecting postcholecystectomy state.  PANCREAS: Fatty atrophy.  SPLEEN: No obvious lesion.    ADRENALS: Unremarkable.  KIDNEYS/URETERS: No hydronephrosis, hydroureter or significant   perinephric stranding. Question striated bilateral nephrogram. A 3.5 x   4.0 cm left renal cyst. No radiopaque urinary tract stone. Subcentimeter   hypodensities, too small to characterize.  BLADDER: Degraded by artifact. Partially distended.  REPRODUCTIVE ORGANS: Degraded by artifact. Anteverted uterus.    BOWEL: Dilated fecalized distal/terminal ileum with mild surrounding   stranding. No discrete transition to suggest bowel obstruction.   Borderline dilated proximal/mid appendix tapering distally. Colon   diverticulosis. A moderate amount of stool throughout the colon.  PERITONEUM: No organized fluid collection or free air.  VESSELS: Atherosclerosis. Normal caliber of the abdominal aorta.  RETROPERITONEUM/LYMPH NODE: No lymphadenopathy.  ABDOMINAL WALL/SOFT TISSUES: Stranding in the paraspinal soft tissue. A   13.0 x 1.8 x 23.0 cm minimally rim-enhancing fluid collection in the   paraspinal superficial soft tissue (3:43 and 4:82), with a drain in   place. Question 9.0 x 3.0 x 13.0 cm fluid collectionin the deeper   paraspinal muscle (3:57 and 4:78). Suboptimal evaluation of the spinal   canal/neural foramina. Minimal stranding in the prevertebral soft tissue.  BONES: Degenerative changes of the spine. Posterior fixation of the   thoracolumbar spine (and sacroiliac bones. Right total hip arthroplasty.   Hardware artifact degrading images.    IMPRESSION:    A moderate amount of stool throughout the colon, especially right colon   and distal/terminal ileum. Dilated fecalized distal/terminal ileum with   mild surrounding stranding, which may be due to incompetent ileocecal   valve and reflux of cecal contents. No discrete transition to suggest   bowel obstruction.    Borderline dilated proximal/mid appendix tapering distally without   significant inflammatory change. Recommend clinical correlation.    Question striated bilateral nephrogram. Recommend clinical correlation to   assess urinary tract infection.    Postsurgical changes in the spine. Fluid collections with stranding in   the paraspinal soft tissue as described, with a drain in place.   Suboptimal evaluation of the spinal canal/neural foramina. Recommend   clinical correlation to assess infection. Recommend comparison to   previous outside study and follow-up (MR) as indicated.      Assessment :   72YO F PMH of heart transplant 23 years ago on cellcept and tacrolimus, anxiety, HTN, HLD, constipation, s/p spinal fusion sx at  [facility] on 2/21 discharged on 2/26th presented to ED to be evaluated for abdominal distention/discomfort associated with constipation. Since surgery she has not had BMs and also on pain meds   CT abd/pel showed mod amount of stool burden and Post surgical changes. WBC 15K Denies fever chills n/v/d CP SOB urinary symptoms. Denies back pain.  Constipation   UA neg  Leukocytosis reactive  Seen by surgery and GI    Plan :   Dc zosyn and monitor off antibiotics  Trend temps and cbc  If spikes temp will get cultures  Bowel regiment  Pulm toileting  Increase activity      Continue with present regiment .  Approptiate use of antibiotics and adverse effects reviewed.      I have discussed the above plan of care with patient in detail. She expressed understanding of the treatment plan . Risks, benefits and alternatives discussed in detail. I have asked if she has  any questions or concerns and appropriately addressed them to the best of my ability    > 45 minutes spent in direct patient care reviewing  the notes, lab data/ imaging , discussion with multidisciplinary team. All questions were addressed and answered to the best of my capacity .    Thank you for allowing me to participate in the care of your patient .      Hortensia Sequeira MD  Infectious Disease  [phone]
[facility]  Indio Casper PA-C  [address] [place], [place] [id number] [phone]      Chief Complaint:  Patient is a 73y old  Female who presents with a chief complaint of obstipation (02 Mar 2024 04:31)      HPI:72 y/o female with PMH of heart transplant 23 years ago currently on cellcept and tacrolimus, anxiety, HTN, HLD, constipation, s/p spinal fusion sx at  [facility] on  discharged on  presented to ED to be evaluated for abdominal distention associated with constipation. pt states that since her surgery at [place] she has not had any bowel movement. mentions that multiple regiments were tried on her at [place] including emenas but she was discharged before having bowel movement. in last 48 hours pt have not been able to pass gas which prompted her to come to ED.    Allergies:  No Known Allergies      Medications:  acetaminophen     Tablet .. 650 milliGRAM(s) Oral every 6 hours PRN  ALPRAZolam 0.25 milliGRAM(s) Oral daily PRN  aluminum hydroxide/magnesium hydroxide/simethicone Suspension 30 milliLiter(s) Oral every 4 hours PRN  aspirin  chewable 81 milliGRAM(s) Oral daily  atorvastatin 20 milliGRAM(s) Oral at bedtime  bisacodyl 5 milliGRAM(s) Oral every 12 hours  gabapentin 100 milliGRAM(s) Oral every 12 hours  heparin   Injectable 5000 Unit(s) SubCutaneous every 8 hours  melatonin 3 milliGRAM(s) Oral at bedtime PRN  metoprolol succinate ER 25 milliGRAM(s) Oral daily  mycophenolate mofetil 500 milliGRAM(s) Oral two times a day  naloxegol 25 milliGRAM(s) Oral daily  ondansetron Injectable 4 milliGRAM(s) IV Push every 8 hours PRN  pantoprazole    Tablet 40 milliGRAM(s) Oral before breakfast  PARoxetine 10 milliGRAM(s) Oral daily  piperacillin/tazobactam IVPB.. 3.375 Gram(s) IV Intermittent every 8 hours  polyethylene glycol/electrolyte Solution 2 Liter(s) Oral once  senna 2 Tablet(s) Oral at bedtime  sodium chloride 0.9%. 1000 milliLiter(s) IV Continuous <Continuous>  tacrolimus 2 milliGRAM(s) Oral at bedtime  tacrolimus 1.5 milliGRAM(s) Oral with breakfast      PMHX/PSHX:  H/O spinal fusion    H/O heart transplant        Family history:      Social History:     ROS:     General:  no fevers, chills, night sweats, fatigue,   Eyes:  Good vision, no reported pain  ENT:  No sore throat, pain, runny nose, dysphagia  CV:  No pain, palpitations, hypo/hypertension  Resp:  No dyspnea, cough, tachypnea, wheezing  GI:  No pain, No nausea, No vomiting, No diarrhea, No constipation, No weight loss, No fever, No pruritis, No rectal bleeding, No tarry stools, No dysphagia,  :  No pain, bleeding, incontinence, nocturia  Muscle:  No pain, weakness  Neuro:  No weakness, tingling, memory problems  Psych:  No fatigue, insomnia, mood problems, depression  Endocrine:  No polyuria, polydipsia, cold/heat intolerance  Heme:  No petechiae, ecchymosis, easy bruisability  Skin:  No rash, tattoos, scars, edema      PHYSICAL EXAM:   Vital Signs:  Vital Signs Last 24 Hrs  T(C): 36.4 (02 Mar 2024 06:06), Max: 37.3 (01 Mar 2024 18:22)  T(F): 97.5 (02 Mar 2024 06:06), Max: 99.1 (01 Mar 2024 18:22)  HR: 97 (02 Mar 2024 06:06) (97 - 109)  BP: 133/79 (02 Mar 2024 06:06) (120/70 - 139/82)  BP(mean): --  RR: 18 (02 Mar 2024 06:06) (16 - 20)  SpO2: 95% (02 Mar 2024 06:06) (95% - 100%)    Parameters below as of 02 Mar 2024 06:06  Patient On (Oxygen Delivery Method): room air      Daily Height in cm: 165.1 (01 Mar 2024 18:22)    Daily Weight in k.5 (02 Mar 2024 06:06)    GENERAL:  Appears stated age,   HEENT:  NC/AT,    CHEST:  Full & symmetric excursion,   HEART:  Regular rhythm  ABDOMEN:  Soft, non-tender, non-distended,   EXTEREMITIES:  no cyanosis,clubbing or edema  SKIN:  No rash  NEURO:  Alert,    LABS:                        10.8   15.15 )-----------( 309      ( 01 Mar 2024 18:50 )             33.5     03-01    132<L>  |  96  |  19  ----------------------------<  127<H>  4.7   |  28  |  1.19    Ca    9.3      01 Mar 2024 18:50    TPro  6.6  /  Alb  2.8<L>  /  TBili  0.7  /  DBili  x   /  AST  20  /  ALT  15  /  AlkPhos  61  03-01    LIVER FUNCTIONS - ( 01 Mar 2024 18:50 )  Alb: 2.8 g/dL / Pro: 6.6 g/dL / ALK PHOS: 61 U/L / ALT: 15 U/L / AST: 20 U/L / GGT: x             Urinalysis Basic - ( 01 Mar 2024 21:10 )    Color: Yellow / Appearance: Clear / S.008 / pH: x  Gluc: x / Ketone: Negative mg/dL  / Bili: Negative / Urobili: 0.2 mg/dL   Blood: x / Protein: Negative mg/dL / Nitrite: Negative   Leuk Esterase: Negative / RBC: x / WBC x   Sq Epi: x / Non Sq Epi: x / Bacteria: x      Amylase Serum--      Lipase serum13       Ammonia--      Imaging:

## 2024-03-02 NOTE — H&P ADULT - ASSESSMENT
obstipation:  -last reported BM prior to feb 21st. last time passed gas was 2 days ago  -CT abd/pel with heavy stool burden  -takes tramadol 50 mg q4h PRN at home post surgery  -trial of go lytely   -C/w dulcolax and senna    leukocytosis:  -could contribute from her constipation  -given current immune status neto place on zosyn  -sx consult for possible appendicitis    s/p heart transplant:  -reassuring VS   -c/w cellcept 500 mg bid, C/w tacrolimus 1.5 mg qam and 2 mg qpm    HTN:  -C/w metoprolol succinate 25 mg qd    HLD:  -C/w lipitor 20 mg qd     anxiety:  -C/w paxil 10 mg qd  -xanax 0.25 mg daily PRN       obstipation:  -last reported BM prior to feb 21st. last time passed gas was 2 days ago  -CT abd/pel with heavy stool burden  -takes tramadol 50 mg q4h PRN at home post surgery  -trial of go lytely   -C/w dulcolax and senna    leukocytosis:  -could contribute from her constipation  -given current immune status will place on zosyn  -sx consult for possible appendicitis  -F/u Lactic acid     s/p heart transplant:  -reassuring VS   -c/w cellcept 500 mg bid, C/w tacrolimus 1.5 mg qam and 2 mg qpm    HTN:  -C/w metoprolol succinate 25 mg qd    HLD:  -C/w lipitor 20 mg qd     anxiety:  -C/w paxil 10 mg qd  -xanax 0.25 mg daily PRN

## 2024-03-02 NOTE — CARE COORDINATION ASSESSMENT. - NSDCPLANSERVICES_GEN_ALL_CORE
This CM met at the bedside with the pt. Introduced myself as the CM explained my role and left contact information. The pt verbalized understanding. The pt is independent with ADL's and ambulating with a rolling walker. The pt lives in an apartment on the first floor no steps with her . The pt has no HHA or services in the home. The pt's  drives her to her MD appts. The PCP is Dr. Kailash Marin and the pharmacy is [facility] in [place]. No skilled needs anticipated. The  will transport the pt home once she is medically cleared. The CM team to remain available for post acute needs./No Anticipated Discharge Needs

## 2024-03-02 NOTE — CONSULT NOTE ADULT - ASSESSMENT
74 yo fem with h/o cardiac transplant, constipated  -Minimize pain medication  -Ambulation  -Laxative  -GI consult

## 2024-03-02 NOTE — H&P ADULT - NSHPLABSRESULTS_GEN_ALL_CORE
< from: CT Abdomen and Pelvis w/ Oral Cont and w/ IV Cont (03.01.24 @ 22:13) >    A moderate amount of stool throughout the colon, especially right colon   and distal/terminal ileum. Dilated fecalized distal/terminal ileum with   mild surrounding stranding, which may be due to incompetent ileocecal   valve and reflux of cecal contents. No discrete transition to suggest   bowel obstruction.    Borderline dilated proximal/mid appendix tapering distally without   significant inflammatory change. Recommend clinical correlation.    Question striated bilateral nephrogram. Recommend clinical correlation to   assess urinary tract infection.    Postsurgical changes in the spine. Fluid collections with stranding in   the paraspinal soft tissue as described, with a drain in place.   Suboptimal evaluation of the spinal canal/neural foramina. Recommend   clinical correlation to assess infection. Recommend comparison to   previous outside study and follow-up (MR) as indicated.    < end of copied text >

## 2024-03-02 NOTE — H&P ADULT - HISTORY OF PRESENT ILLNESS
74 y/o female with PMH of heart transplant 23 years ago currently on cellcept and tacrolimus, anxiety, HTN, HLD, constipation, [facility] on 2/26th presented to ED to be evaluated for abdominal distention associated with constipation. pt states that since her surgery at [place] she has not had any bowel movement. mentions that multiple regiments were tried on her at [place] including emenas but she was discharged before having bowel movement. in last 48 hours pt have not been able to pass gas which prompted her to come to ED.    ED Course:  pt was given mag citrate but could not finish due to nausea  CT abd/pel: w/o SBO or bowel obstruction. showed large stool burden

## 2024-03-02 NOTE — PATIENT PROFILE ADULT - FALL HARM RISK - HARM RISK INTERVENTIONS

## 2024-03-03 LAB
ANION GAP SERPL CALC-SCNC: 6 MMOL/L — SIGNIFICANT CHANGE UP (ref 5–17)
BASOPHILS # BLD AUTO: 0.02 K/UL — SIGNIFICANT CHANGE UP (ref 0–0.2)
BASOPHILS NFR BLD AUTO: 0.2 % — SIGNIFICANT CHANGE UP (ref 0–2)
BUN SERPL-MCNC: 13 MG/DL — SIGNIFICANT CHANGE UP (ref 7–23)
CALCIUM SERPL-MCNC: 8.7 MG/DL — SIGNIFICANT CHANGE UP (ref 8.4–10.5)
CHLORIDE SERPL-SCNC: 101 MMOL/L — SIGNIFICANT CHANGE UP (ref 96–108)
CO2 SERPL-SCNC: 30 MMOL/L — SIGNIFICANT CHANGE UP (ref 22–31)
CREAT SERPL-MCNC: 0.99 MG/DL — SIGNIFICANT CHANGE UP (ref 0.5–1.3)
EGFR: 60 ML/MIN/1.73M2 — SIGNIFICANT CHANGE UP
EOSINOPHIL # BLD AUTO: 0.08 K/UL — SIGNIFICANT CHANGE UP (ref 0–0.5)
EOSINOPHIL NFR BLD AUTO: 0.7 % — SIGNIFICANT CHANGE UP (ref 0–6)
GLUCOSE SERPL-MCNC: 104 MG/DL — HIGH (ref 70–99)
HCT VFR BLD CALC: 30.7 % — LOW (ref 34.5–45)
HCV AB S/CO SERPL IA: 0.1 S/CO — SIGNIFICANT CHANGE UP (ref 0–0.99)
HCV AB SERPL-IMP: SIGNIFICANT CHANGE UP
HGB BLD-MCNC: 9.7 G/DL — LOW (ref 11.5–15.5)
IMM GRANULOCYTES NFR BLD AUTO: 1.2 % — HIGH (ref 0–0.9)
LYMPHOCYTES # BLD AUTO: 0.68 K/UL — LOW (ref 1–3.3)
LYMPHOCYTES # BLD AUTO: 6.2 % — LOW (ref 13–44)
MCHC RBC-ENTMCNC: 28.4 PG — SIGNIFICANT CHANGE UP (ref 27–34)
MCHC RBC-ENTMCNC: 31.6 GM/DL — LOW (ref 32–36)
MCV RBC AUTO: 90 FL — SIGNIFICANT CHANGE UP (ref 80–100)
MONOCYTES # BLD AUTO: 0.87 K/UL — SIGNIFICANT CHANGE UP (ref 0–0.9)
MONOCYTES NFR BLD AUTO: 7.9 % — SIGNIFICANT CHANGE UP (ref 2–14)
NEUTROPHILS # BLD AUTO: 9.25 K/UL — HIGH (ref 1.8–7.4)
NEUTROPHILS NFR BLD AUTO: 83.8 % — HIGH (ref 43–77)
NRBC # BLD: 0 /100 WBCS — SIGNIFICANT CHANGE UP (ref 0–0)
PLATELET # BLD AUTO: 302 K/UL — SIGNIFICANT CHANGE UP (ref 150–400)
POTASSIUM SERPL-MCNC: 4 MMOL/L — SIGNIFICANT CHANGE UP (ref 3.5–5.3)
POTASSIUM SERPL-SCNC: 4 MMOL/L — SIGNIFICANT CHANGE UP (ref 3.5–5.3)
RBC # BLD: 3.41 M/UL — LOW (ref 3.8–5.2)
RBC # FLD: 16.5 % — HIGH (ref 10.3–14.5)
SODIUM SERPL-SCNC: 137 MMOL/L — SIGNIFICANT CHANGE UP (ref 135–145)
WBC # BLD: 11.03 K/UL — HIGH (ref 3.8–10.5)
WBC # FLD AUTO: 11.03 K/UL — HIGH (ref 3.8–10.5)

## 2024-03-03 PROCEDURE — 99232 SBSQ HOSP IP/OBS MODERATE 35: CPT | Mod: FS

## 2024-03-03 RX ORDER — TRAMADOL HYDROCHLORIDE 50 MG/1
50 TABLET ORAL EVERY 6 HOURS
Refills: 0 | Status: DISCONTINUED | OUTPATIENT
Start: 2024-03-03 | End: 2024-03-06

## 2024-03-03 RX ORDER — ACETAMINOPHEN 500 MG
1000 TABLET ORAL ONCE
Refills: 0 | Status: COMPLETED | OUTPATIENT
Start: 2024-03-03 | End: 2024-03-03

## 2024-03-03 RX ADMIN — NALOXEGOL OXALATE 25 MILLIGRAM(S): 12.5 TABLET, FILM COATED ORAL at 12:01

## 2024-03-03 RX ADMIN — SENNA PLUS 2 TABLET(S): 8.6 TABLET ORAL at 22:22

## 2024-03-03 RX ADMIN — Medication 650 MILLIGRAM(S): at 16:25

## 2024-03-03 RX ADMIN — ATORVASTATIN CALCIUM 20 MILLIGRAM(S): 80 TABLET, FILM COATED ORAL at 22:22

## 2024-03-03 RX ADMIN — PANTOPRAZOLE SODIUM 40 MILLIGRAM(S): 20 TABLET, DELAYED RELEASE ORAL at 06:25

## 2024-03-03 RX ADMIN — MYCOPHENOLATE MOFETIL 500 MILLIGRAM(S): 250 CAPSULE ORAL at 17:40

## 2024-03-03 RX ADMIN — Medication 650 MILLIGRAM(S): at 23:04

## 2024-03-03 RX ADMIN — TRAMADOL HYDROCHLORIDE 50 MILLIGRAM(S): 50 TABLET ORAL at 17:39

## 2024-03-03 RX ADMIN — Medication 5 MILLIGRAM(S): at 06:25

## 2024-03-03 RX ADMIN — TRAMADOL HYDROCHLORIDE 50 MILLIGRAM(S): 50 TABLET ORAL at 18:10

## 2024-03-03 RX ADMIN — Medication 81 MILLIGRAM(S): at 12:01

## 2024-03-03 RX ADMIN — GABAPENTIN 100 MILLIGRAM(S): 400 CAPSULE ORAL at 06:25

## 2024-03-03 RX ADMIN — HEPARIN SODIUM 5000 UNIT(S): 5000 INJECTION INTRAVENOUS; SUBCUTANEOUS at 06:25

## 2024-03-03 RX ADMIN — Medication 400 MILLIGRAM(S): at 10:16

## 2024-03-03 RX ADMIN — TACROLIMUS 1.5 MILLIGRAM(S): 5 CAPSULE ORAL at 07:53

## 2024-03-03 RX ADMIN — Medication 10 MILLIGRAM(S): at 12:01

## 2024-03-03 RX ADMIN — Medication 0.25 MILLIGRAM(S): at 22:22

## 2024-03-03 RX ADMIN — HEPARIN SODIUM 5000 UNIT(S): 5000 INJECTION INTRAVENOUS; SUBCUTANEOUS at 22:22

## 2024-03-03 RX ADMIN — TACROLIMUS 2 MILLIGRAM(S): 5 CAPSULE ORAL at 22:22

## 2024-03-03 RX ADMIN — MYCOPHENOLATE MOFETIL 500 MILLIGRAM(S): 250 CAPSULE ORAL at 06:16

## 2024-03-03 RX ADMIN — Medication 650 MILLIGRAM(S): at 15:45

## 2024-03-03 RX ADMIN — SODIUM CHLORIDE 72 MILLILITER(S): 9 INJECTION INTRAMUSCULAR; INTRAVENOUS; SUBCUTANEOUS at 01:15

## 2024-03-03 RX ADMIN — HEPARIN SODIUM 5000 UNIT(S): 5000 INJECTION INTRAVENOUS; SUBCUTANEOUS at 15:45

## 2024-03-03 RX ADMIN — Medication 25 MILLIGRAM(S): at 06:16

## 2024-03-03 RX ADMIN — Medication 650 MILLIGRAM(S): at 22:22

## 2024-03-03 RX ADMIN — GABAPENTIN 100 MILLIGRAM(S): 400 CAPSULE ORAL at 17:39

## 2024-03-03 RX ADMIN — Medication 1000 MILLIGRAM(S): at 10:45

## 2024-03-03 RX ADMIN — Medication 5 MILLIGRAM(S): at 17:39

## 2024-03-03 NOTE — PROGRESS NOTE ADULT - SUBJECTIVE AND OBJECTIVE BOX
[facility]  Indio Casper PA-C  [address] [place] [address]  [phone]      INTERVAL HPI/OVERNIGHT EVENTS:    + bm after golyetly    MEDICATIONS  (STANDING):  aspirin  chewable 81 milliGRAM(s) Oral daily  atorvastatin 20 milliGRAM(s) Oral at bedtime  bisacodyl 5 milliGRAM(s) Oral every 12 hours  gabapentin 100 milliGRAM(s) Oral every 12 hours  heparin   Injectable 5000 Unit(s) SubCutaneous every 8 hours  metoprolol succinate ER 25 milliGRAM(s) Oral daily  mycophenolate mofetil 500 milliGRAM(s) Oral two times a day  naloxegol 25 milliGRAM(s) Oral daily  pantoprazole    Tablet 40 milliGRAM(s) Oral before breakfast  PARoxetine 10 milliGRAM(s) Oral daily  senna 2 Tablet(s) Oral at bedtime  tacrolimus 2 milliGRAM(s) Oral at bedtime  tacrolimus 1.5 milliGRAM(s) Oral with breakfast    MEDICATIONS  (PRN):  acetaminophen     Tablet .. 650 milliGRAM(s) Oral every 6 hours PRN Temp greater or equal to 38C (100.4F), Mild Pain (1 - 3)  ALPRAZolam 0.25 milliGRAM(s) Oral daily PRN anxiety  aluminum hydroxide/magnesium hydroxide/simethicone Suspension 30 milliLiter(s) Oral every 4 hours PRN Dyspepsia  melatonin 3 milliGRAM(s) Oral at bedtime PRN Insomnia  ondansetron Injectable 4 milliGRAM(s) IV Push every 8 hours PRN Nausea and/or Vomiting  traMADol 50 milliGRAM(s) Oral every 6 hours PRN Severe Pain (7 - 10)      Allergies    No Known Allergies    Intolerances        ROS:   General:  No  fevers, chills, night sweats, fatigue,   Eyes:  Good vision, no reported pain  ENT:  No sore throat, pain, runny nose, dysphagia  CV:  No pain, palpitations, hypo/hypertension  Resp:  No dyspnea, cough, tachypnea, wheezing  GI:  No pain, No nausea, No vomiting, No diarrhea, No constipation, No weight loss, No fever, No pruritis, No rectal bleeding, No tarry stools, No dysphagia,  :  No pain, bleeding, incontinence, nocturia  Muscle:  No pain, weakness  Neuro:  No weakness, tingling, memory problems  Psych:  No fatigue, insomnia, mood problems, depression  Endocrine:  No polyuria, polydipsia, cold/heat intolerance  Heme:  No petechiae, ecchymosis, easy bruisability  Skin:  No rash, tattoos, scars, edema      PHYSICAL EXAM:   Vital Signs:  Vital Signs Last 24 Hrs  T(C): 36.8 (04 Mar 2024 07:24), Max: 36.8 (04 Mar 2024 07:24)  T(F): 98.2 (04 Mar 2024 07:24), Max: 98.2 (04 Mar 2024 07:24)  HR: 100 (04 Mar 2024 07:24) (90 - 100)  BP: 135/83 (04 Mar 2024 07:24) (129/82 - 141/77)  BP(mean): --  RR: 18 (04 Mar 2024 07:24) (15 - 18)  SpO2: 97% (04 Mar 2024 07:24) (93% - 97%)    Parameters below as of 04 Mar 2024 07:24  Patient On (Oxygen Delivery Method): room air      Daily     Daily     GENERAL:  Appears stated age,   HEENT:  NC/AT,    CHEST:  Full & symmetric excursion,   HEART:  Regular rhythm,  ABDOMEN:  Soft, non-tender, non-distended,  EXTEREMITIES:  no cyanosis  SKIN:  No rash  NEURO:  Alert,       LABS:                        9.7    11.03 )-----------( 302      ( 03 Mar 2024 06:00 )             30.7     03-03    137  |  101  |  13  ----------------------------<  104<H>  4.0   |  30  |  0.99    Ca    8.7      03 Mar 2024 06:00        Urinalysis Basic - ( 03 Mar 2024 06:00 )    Color: x / Appearance: x / SG: x / pH: x  Gluc: 104 mg/dL / Ketone: x  / Bili: x / Urobili: x   Blood: x / Protein: x / Nitrite: x   Leuk Esterase: x / RBC: x / WBC x   Sq Epi: x / Non Sq Epi: x / Bacteria: x        RADIOLOGY & ADDITIONAL TESTS:

## 2024-03-03 NOTE — PROGRESS NOTE ADULT - SUBJECTIVE AND OBJECTIVE BOX
INTERVAL HPI/OVERNIGHT EVENTS:   Patient seen and examined.  Feeling better overall, had several formed/soft BMs since yesterday, abdominal discomfort improving.  Voiding without issues, ambulating to bathroom with walker.      REVIEW OF SYSTEMS:  See HPI,  all others negative    PHYSICAL EXAM:  Vital Signs Last 24 Hrs  T(C): 36.9 (03 Mar 2024 05:09), Max: 37.1 (02 Mar 2024 14:36)  T(F): 98.4 (03 Mar 2024 05:09), Max: 98.7 (02 Mar 2024 14:36)  HR: 95 (03 Mar 2024 05:09) (95 - 99)  BP: 143/77 (03 Mar 2024 05:09) (131/72 - 144/80)  BP(mean): --  RR: 17 (03 Mar 2024 05:09) (17 - 18)  SpO2: 91% (03 Mar 2024 05:09) (91% - 96%)    Parameters below as of 03 Mar 2024 05:09  Patient On (Oxygen Delivery Method): room air    GENERAL: NAD, well-groomed, well-developed, awake, alert, oriented x 3, fluent and coherent speech  EYES: EOMI, PERRLA, conjunctiva and sclera clear  ENMT: No tonsillar erythema, exudates, or enlargement; Moist mucous membranes,  No lesions seen on oral mucosa  NECK: Supple, No JVD, No Cervical LAD   NERVOUS SYSTEM:  Good concentration; Moving all 4 extremities against gravity; No gross sensory deficits, No facial droop  CHEST/LUNG: Clear to auscultation bilaterally with good air entry; No rales, rhonchi, wheezing, or rubs  HEART: Regular rate and rhythm; No murmurs, rubs, or gallops  ABDOMEN: Soft, mild tenderness across upper abd, Nondistended, Bowel sounds present, No palpable masses or organomegaly, No bruits  EXTREMITIES:  2+ Peripheral Pulses, No clubbing, cyanosis, or edema, no calf tenderness in either leg    Diagnostic Testin.7    11.03 )-----------( 302      ( 03 Mar 2024 06:00 )             30.7     03 Mar 2024 06:00    137    |  101    |  13     ----------------------------<  104    4.0     |  30     |  0.99     Ca    8.7        03 Mar 2024 06:00        Urinalysis Basic - ( 03 Mar 2024 06:00 )    Color: x / Appearance: x / SG: x / pH: x  Gluc: 104 mg/dL / Ketone: x  / Bili: x / Urobili: x   Blood: x / Protein: x / Nitrite: x   Leuk Esterase: x / RBC: x / WBC x   Sq Epi: x / Non Sq Epi: x / Bacteria: x

## 2024-03-03 NOTE — PROGRESS NOTE ADULT - ASSESSMENT
Obstipation likely due to opioid induced constipation:  -prior to admission, last reported BM was prior to spinal fusion procedure on 2/21  -was taking tramadol 50 mg q4h PRN at home post surgery  -CT abd/pel with heavy stool burden  -C/w dulcolax and senna  - s/p golytely  - starting to have BMs  - GI consult appreciated ->  started on Movantik  - on clears, advance diet as tolerated     Leukocytosis  -could contribute from her constipation  -given current immune status was placed on zosyn, DCed 3/2  -sx consult for possible appendicitis appreciated, dilated appendix on CT likely related to ileus  - improving    s/p heart transplant 23ya   -c/w cellcept 500 mg bid, C/w tacrolimus 1.5 mg qam and 2 mg qpm    HTN:  -C/w metoprolol succinate 25 mg qd    HLD:  -C/w lipitor 20 mg qd     anxiety:  -C/w paxil 10 mg qd  -xanax 0.25 mg daily PRN     s/p spinal fusion with drain  - f/u with surgeon at [facility]    VTE PPx - Heparin SC      Obstipation likely due to opioid induced constipation:  -prior to admission, last reported BM was prior to spinal fusion procedure on 2/21  -was taking tramadol 50 mg q4h PRN at home post surgery  -CT abd/pel with heavy stool burden  -C/w dulcolax and senna  - s/p golytely  - starting to have BMs  - GI consult appreciated ->  started on Movantik  - on clears, advance diet as tolerated     Leukocytosis  -could contribute from her constipation  -given current immune status was placed on zosyn, DCed 3/2  -sx consult for possible appendicitis appreciated, dilated appendix on CT likely related to ileus  - improving    s/p heart transplant 23ya   -c/w cellcept 500 mg bid, C/w tacrolimus 1.5 mg qam and 2 mg qpm    HTN:  -C/w metoprolol succinate 25 mg qd    HLD:  -C/w lipitor 20 mg qd     anxiety:  -C/w paxil 10 mg qd  -xanax 0.25 mg daily PRN     s/p spinal fusion with drain  - f/u with surgeon at [facility]  - having back pain this AM 3/3 -> ordered IV Acet x 1    VTE PPx - Heparin SC

## 2024-03-03 NOTE — CASE MANAGEMENT PROGRESS NOTE - NSCMPROGRESSNOTE_GEN_ALL_CORE
Pt for possible DC toady if advancing diet today and tolerating and cleared by GI. The pt is independent and will be for home with no skilled needs. The pt's  will transport the pt home. The bedside nurse is aware of the possible DC plan to home this afternoon.

## 2024-03-03 NOTE — PROGRESS NOTE ADULT - SUBJECTIVE AND OBJECTIVE BOX
CHUY BERNAL is a 73yFemale , patient examined and chart reviewed.       INTERVAL HPI/ OVERNIGHT EVENTS:   Having BMs.   Afebrile.   at bedside.    PAST MEDICAL & SURGICAL HISTORY:  H/O spinal fusion  H/O heart transplant      For details regarding the patient's social history, family history, and other miscellaneous elements, please refer the initial infectious diseases consultation and/or the admitting history and physical examination for this admission.    ROS:  CONSTITUTIONAL:  Negative fever or chills  EYES:  Negative  blurry vision or double vision  CARDIOVASCULAR:  Negative for chest pain or palpitations  RESPIRATORY:  Negative for cough, wheezing, or SOB   GASTROINTESTINAL:  Negative for nausea, vomiting, diarrhea, constipation, or abdominal pain  GENITOURINARY:  Negative frequency, urgency or dysuria  NEUROLOGIC:  No headache, confusion, dizziness, lightheadedness  All other systems were reviewed and are negative     No Known Allergies      Current inpatient medications :    ANTIBIOTICS/RELEVANT:      acetaminophen     Tablet .. 650 milliGRAM(s) Oral every 6 hours PRN  ALPRAZolam 0.25 milliGRAM(s) Oral daily PRN  aluminum hydroxide/magnesium hydroxide/simethicone Suspension 30 milliLiter(s) Oral every 4 hours PRN  aspirin  chewable 81 milliGRAM(s) Oral daily  atorvastatin 20 milliGRAM(s) Oral at bedtime  bisacodyl 5 milliGRAM(s) Oral every 12 hours  gabapentin 100 milliGRAM(s) Oral every 12 hours  heparin   Injectable 5000 Unit(s) SubCutaneous every 8 hours  melatonin 3 milliGRAM(s) Oral at bedtime PRN  metoprolol succinate ER 25 milliGRAM(s) Oral daily  naloxegol 25 milliGRAM(s) Oral daily  ondansetron Injectable 4 milliGRAM(s) IV Push every 8 hours PRN  pantoprazole    Tablet 40 milliGRAM(s) Oral before breakfast  PARoxetine 10 milliGRAM(s) Oral daily  senna 2 Tablet(s) Oral at bedtime  traMADol 50 milliGRAM(s) Oral every 6 hours PRN      Objective:    03-02 @ 07:01  -  03-03 @ 07:00  --------------------------------------------------------  IN: 864 mL / OUT: 3 mL / NET: 861 mL    03-03 @ 07:01  -  03-03 @ 21:12  --------------------------------------------------------  IN: 0 mL / OUT: 8 mL / NET: -8 mL      T(C): 36.6 (03-03-24 @ 15:30), Max: 37 (03-02-24 @ 21:17)  HR: 95 (03-03-24 @ 15:30) (95 - 99)  BP: 141/77 (03-03-24 @ 15:30) (131/72 - 144/80)  RR: 15 (03-03-24 @ 15:30) (15 - 18)  SpO2: 97% (03-03-24 @ 15:30) (91% - 97%)      Physical Exam:  General:  no acute distress  Neck: supple, trachea midline  Lungs: clear, no wheeze/rhonchi  Cardiovascular: regular rate and rhythm, S1 S2  Abdomen: soft, nontender,  bowel sounds normal  Neurological: alert and oriented x3  Skin: no rash  Extremities: no edema        LABS:                        9.7    11.03 )-----------( 302      ( 03 Mar 2024 06:00 )             30.7       03-03    137  |  101  |  13  ----------------------------<  104<H>  4.0   |  30  |  0.99    Ca    8.7      03 Mar 2024 06:00      MICROBIOLOGY:              RADIOLOGY & ADDITIONAL STUDIES:    ACC: [id number] EXAM:  CT ABDOMEN AND PELVIS OC IC   ORDERED BY: NOAH PRYOR     PROCEDURE DATE:  03/01/2024          INTERPRETATION:  CLINICAL INDICATION: abd pain, constipation, spinal   fusion and February.    PROCEDURE:  Helical axial images were obtained from the domes of the diaphragm   through the pubic symphysis following the administration of intravenous   contrast. Coronal and sagittal reformats were also obtained.    CONTRAST/COMPLICATIONS:  IV Contrast: Omnipaque 350  90 cc administered   10 cc discarded  Oral Contrast: Omnipaque 300  Complications: None reported at time of study completion    COMPARISON: None.    FINDINGS:    LOWER CHEST: Atelectasis.    LIVER: No obvious lesion.  BILE DUCTS/GALLBLADDER: No intrahepaticbiliary dilatation. Common bile   duct dilatation, reflecting postcholecystectomy state.  PANCREAS: Fatty atrophy.  SPLEEN: No obvious lesion.    ADRENALS: Unremarkable.  KIDNEYS/URETERS: No hydronephrosis, hydroureter or significant   perinephric stranding. Question striated bilateral nephrogram. A 3.5 x   4.0 cm left renal cyst. No radiopaque urinary tract stone. Subcentimeter   hypodensities, too small to characterize.  BLADDER: Degraded by artifact. Partially distended.  REPRODUCTIVE ORGANS: Degraded by artifact. Anteverted uterus.    BOWEL: Dilated fecalized distal/terminal ileum with mild surrounding   stranding. No discrete transition to suggest bowel obstruction.   Borderline dilated proximal/mid appendix tapering distally. Colon   diverticulosis. A moderate amount of stool throughout the colon.  PERITONEUM: No organized fluid collection or free air.  VESSELS: Atherosclerosis. Normal caliber of the abdominal aorta.  RETROPERITONEUM/LYMPH NODE: No lymphadenopathy.  ABDOMINAL WALL/SOFT TISSUES: Stranding in the paraspinal soft tissue. A   13.0 x 1.8 x 23.0 cm minimally rim-enhancing fluid collection in the   paraspinal superficial soft tissue (3:43 and 4:82), with a drain in   place. Question 9.0 x 3.0 x 13.0 cm fluid collectionin the deeper   paraspinal muscle (3:57 and 4:78). Suboptimal evaluation of the spinal   canal/neural foramina. Minimal stranding in the prevertebral soft tissue.  BONES: Degenerative changes of the spine. Posterior fixation of the   thoracolumbar spine (and sacroiliac bones. Right total hip arthroplasty.   Hardware artifact degrading images.    IMPRESSION:    A moderate amount of stool throughout the colon, especially right colon   and distal/terminal ileum. Dilated fecalized distal/terminal ileum with   mild surrounding stranding, which may be due to incompetent ileocecal   valve and reflux of cecal contents. No discrete transition to suggest   bowel obstruction.    Borderline dilated proximal/mid appendix tapering distally without   significant inflammatory change. Recommend clinical correlation.    Question striated bilateral nephrogram. Recommend clinical correlation to   assess urinary tract infection.    Postsurgical changes in the spine. Fluid collections with stranding in   the paraspinal soft tissue as described, with a drain in place.   Suboptimal evaluation of the spinal canal/neural foramina. Recommend   clinical correlation to assess infection. Recommend comparison to   previous outside study and follow-up (MR) as indicated.      Assessment :   72YO F PMH of heart transplant 23 years ago on cellcept and tacrolimus, anxiety, HTN, HLD, constipation, s/p spinal fusion sx at  [facility] on 2/21 discharged on 2/26th admitted with abdominal distention/discomfort sec constipation.   CT abd/pel showed mod amount of stool burden and Post surgical changes. WBC 15K   Constipation   UA neg  Leukocytosis reactive downtrending  + BMs  No signs/symptoms for infectious process  Clinically better    Plan :   Monitor off antibiotics  Trend temps and cbc  Surgery and GI on case  Bowel regiment  Pulm toileting  Increase activity  Stable from ID standpoint    Continue with present regiment.  Appropriate use of antibiotics and adverse effects reviewed.      I have discussed the above plan of care with patient/  in detail. They expressed understanding of the  treatment plan . Risks, benefits and alternatives discussed in detail. I have asked if they have any questions or concerns and appropriately addressed them to the best of my ability .    > 35 minutes were spent in direct patient care reviewing notes, medications ,labs data/ imaging , discussion with multidisciplinary team.    Thank you for allowing me to participate in care of your patient .    Hortensia Sequeira MD  Infectious Disease  [phone]

## 2024-03-04 LAB
ANION GAP SERPL CALC-SCNC: 10 MMOL/L — SIGNIFICANT CHANGE UP (ref 5–17)
BUN SERPL-MCNC: 10 MG/DL — SIGNIFICANT CHANGE UP (ref 7–23)
CALCIUM SERPL-MCNC: 8.7 MG/DL — SIGNIFICANT CHANGE UP (ref 8.4–10.5)
CHLORIDE SERPL-SCNC: 101 MMOL/L — SIGNIFICANT CHANGE UP (ref 96–108)
CO2 SERPL-SCNC: 27 MMOL/L — SIGNIFICANT CHANGE UP (ref 22–31)
CREAT SERPL-MCNC: 0.79 MG/DL — SIGNIFICANT CHANGE UP (ref 0.5–1.3)
EGFR: 79 ML/MIN/1.73M2 — SIGNIFICANT CHANGE UP
GLUCOSE SERPL-MCNC: 85 MG/DL — SIGNIFICANT CHANGE UP (ref 70–99)
HCT VFR BLD CALC: 30.4 % — LOW (ref 34.5–45)
HGB BLD-MCNC: 9.6 G/DL — LOW (ref 11.5–15.5)
MCHC RBC-ENTMCNC: 28.6 PG — SIGNIFICANT CHANGE UP (ref 27–34)
MCHC RBC-ENTMCNC: 31.6 GM/DL — LOW (ref 32–36)
MCV RBC AUTO: 90.5 FL — SIGNIFICANT CHANGE UP (ref 80–100)
NRBC # BLD: 0 /100 WBCS — SIGNIFICANT CHANGE UP (ref 0–0)
PLATELET # BLD AUTO: 314 K/UL — SIGNIFICANT CHANGE UP (ref 150–400)
POTASSIUM SERPL-MCNC: 3.9 MMOL/L — SIGNIFICANT CHANGE UP (ref 3.5–5.3)
POTASSIUM SERPL-SCNC: 3.9 MMOL/L — SIGNIFICANT CHANGE UP (ref 3.5–5.3)
RBC # BLD: 3.36 M/UL — LOW (ref 3.8–5.2)
RBC # FLD: 16.3 % — HIGH (ref 10.3–14.5)
SODIUM SERPL-SCNC: 138 MMOL/L — SIGNIFICANT CHANGE UP (ref 135–145)
WBC # BLD: 10.06 K/UL — SIGNIFICANT CHANGE UP (ref 3.8–10.5)
WBC # FLD AUTO: 10.06 K/UL — SIGNIFICANT CHANGE UP (ref 3.8–10.5)

## 2024-03-04 PROCEDURE — 99232 SBSQ HOSP IP/OBS MODERATE 35: CPT

## 2024-03-04 RX ADMIN — ATORVASTATIN CALCIUM 20 MILLIGRAM(S): 80 TABLET, FILM COATED ORAL at 21:10

## 2024-03-04 RX ADMIN — TACROLIMUS 1.5 MILLIGRAM(S): 5 CAPSULE ORAL at 08:54

## 2024-03-04 RX ADMIN — Medication 10 MILLIGRAM(S): at 11:51

## 2024-03-04 RX ADMIN — Medication 0.25 MILLIGRAM(S): at 21:07

## 2024-03-04 RX ADMIN — NALOXEGOL OXALATE 25 MILLIGRAM(S): 12.5 TABLET, FILM COATED ORAL at 11:51

## 2024-03-04 RX ADMIN — TRAMADOL HYDROCHLORIDE 50 MILLIGRAM(S): 50 TABLET ORAL at 03:47

## 2024-03-04 RX ADMIN — TRAMADOL HYDROCHLORIDE 50 MILLIGRAM(S): 50 TABLET ORAL at 12:45

## 2024-03-04 RX ADMIN — Medication 650 MILLIGRAM(S): at 22:07

## 2024-03-04 RX ADMIN — Medication 650 MILLIGRAM(S): at 08:55

## 2024-03-04 RX ADMIN — TACROLIMUS 2 MILLIGRAM(S): 5 CAPSULE ORAL at 21:10

## 2024-03-04 RX ADMIN — TRAMADOL HYDROCHLORIDE 50 MILLIGRAM(S): 50 TABLET ORAL at 04:21

## 2024-03-04 RX ADMIN — HEPARIN SODIUM 5000 UNIT(S): 5000 INJECTION INTRAVENOUS; SUBCUTANEOUS at 06:03

## 2024-03-04 RX ADMIN — Medication 81 MILLIGRAM(S): at 11:51

## 2024-03-04 RX ADMIN — Medication 5 MILLIGRAM(S): at 06:04

## 2024-03-04 RX ADMIN — HEPARIN SODIUM 5000 UNIT(S): 5000 INJECTION INTRAVENOUS; SUBCUTANEOUS at 17:54

## 2024-03-04 RX ADMIN — PANTOPRAZOLE SODIUM 40 MILLIGRAM(S): 20 TABLET, DELAYED RELEASE ORAL at 06:05

## 2024-03-04 RX ADMIN — GABAPENTIN 100 MILLIGRAM(S): 400 CAPSULE ORAL at 06:05

## 2024-03-04 RX ADMIN — TRAMADOL HYDROCHLORIDE 50 MILLIGRAM(S): 50 TABLET ORAL at 11:59

## 2024-03-04 RX ADMIN — MYCOPHENOLATE MOFETIL 500 MILLIGRAM(S): 250 CAPSULE ORAL at 06:04

## 2024-03-04 RX ADMIN — Medication 25 MILLIGRAM(S): at 06:05

## 2024-03-04 RX ADMIN — Medication 650 MILLIGRAM(S): at 21:07

## 2024-03-04 RX ADMIN — GABAPENTIN 100 MILLIGRAM(S): 400 CAPSULE ORAL at 17:54

## 2024-03-04 RX ADMIN — MYCOPHENOLATE MOFETIL 500 MILLIGRAM(S): 250 CAPSULE ORAL at 17:54

## 2024-03-04 NOTE — PROGRESS NOTE ADULT - SUBJECTIVE AND OBJECTIVE BOX
INTERVAL HPI/OVERNIGHT EVENTS:  HPI:  No new overnight event.  No N/V/D.  Tolerating diet.     MEDICATIONS  (STANDING):  aspirin  chewable 81 milliGRAM(s) Oral daily  atorvastatin 20 milliGRAM(s) Oral at bedtime  bisacodyl 5 milliGRAM(s) Oral every 12 hours  gabapentin 100 milliGRAM(s) Oral every 12 hours  heparin   Injectable 5000 Unit(s) SubCutaneous every 8 hours  metoprolol succinate ER 25 milliGRAM(s) Oral daily  mycophenolate mofetil 500 milliGRAM(s) Oral two times a day  naloxegol 25 milliGRAM(s) Oral daily  pantoprazole    Tablet 40 milliGRAM(s) Oral before breakfast  PARoxetine 10 milliGRAM(s) Oral daily  senna 2 Tablet(s) Oral at bedtime  tacrolimus 2 milliGRAM(s) Oral at bedtime  tacrolimus 1.5 milliGRAM(s) Oral with breakfast    MEDICATIONS  (PRN):  acetaminophen     Tablet .. 650 milliGRAM(s) Oral every 6 hours PRN Temp greater or equal to 38C (100.4F), Mild Pain (1 - 3)  ALPRAZolam 0.25 milliGRAM(s) Oral daily PRN anxiety  aluminum hydroxide/magnesium hydroxide/simethicone Suspension 30 milliLiter(s) Oral every 4 hours PRN Dyspepsia  melatonin 3 milliGRAM(s) Oral at bedtime PRN Insomnia  ondansetron Injectable 4 milliGRAM(s) IV Push every 8 hours PRN Nausea and/or Vomiting  traMADol 50 milliGRAM(s) Oral every 6 hours PRN Severe Pain (7 - 10)      Allergies  No Known Allergies    Intolerances    General:  No wt loss, fevers, chills, night sweats, fatigue,   Eyes:  Good vision, no reported pain  ENT:  No sore throat, pain, runny nose, dysphagia  CV:  No pain, palpitations, hypo/hypertension  Resp:  No dyspnea, cough, tachypnea, wheezing  GI:  see HPI  :  No pain, bleeding, incontinence, nocturia  Muscle:  No pain, weakness  Neuro:  No weakness, tingling, memory problems  Psych:  No fatigue, insomnia, mood problems, depression  Endocrine:  No polyuria, polydipsia, cold/heat intolerance  Heme:  No petechiae, ecchymosis, easy bruisability  Skin:  No rash, tattoos, scars, edema      PHYSICAL EXAM:   Vital Signs:  Vital Signs Last 24 Hrs  T(C): 36.8 (04 Mar 2024 07:24), Max: 36.8 (04 Mar 2024 07:24)  T(F): 98.2 (04 Mar 2024 07:24), Max: 98.2 (04 Mar 2024 07:24)  HR: 100 (04 Mar 2024 07:24) (90 - 100)  BP: 135/83 (04 Mar 2024 07:24) (129/82 - 141/77)  BP(mean): --  RR: 18 (04 Mar 2024 07:24) (15 - 18)  SpO2: 97% (04 Mar 2024 07:24) (93% - 97%)    Parameters below as of 04 Mar 2024 07:24  Patient On (Oxygen Delivery Method): room air      Daily     Daily I&O's Summary    03 Mar 2024 07:01  -  04 Mar 2024 07:00  --------------------------------------------------------  IN: 0 mL / OUT: 13 mL / NET: -13 mL    GENERAL:  Appears stated age, well-groomed, well-nourished, no distress  HEENT:  NC/AT,  conjunctivae clear and pink, no thyromegaly, nodules, adenopathy, no JVD, sclera -anicteric  CHEST:  Full & symmetric excursion, no increased effort, breath sounds clear  HEART:  Regular rhythm, S1, S2, no murmur/rub/S3/S4, no abdominal bruit, no edema  ABDOMEN:  Soft, non-tender, non-distended, normoactive bowel sounds,  no masses ,no hepato-splenomegaly, no signs of chronic liver disease  EXTEREMITIES:  no cyanosis,clubbing or edema  SKIN:  No rash/erythema/ecchymoses/petechiae/wounds/abscess/warm/dry  NEURO:  Alert, oriented, no asterixis, no tremor, no encephalopathy      LABS:                        9.6    10.06 )-----------( 314      ( 04 Mar 2024 07:49 )             30.4     03-04    138  |  101  |  10  ----------------------------<  85  3.9   |  27  |  0.79    Ca    8.7      04 Mar 2024 07:49        Urinalysis Basic - ( 04 Mar 2024 07:49 )    Color: x / Appearance: x / SG: x / pH: x  Gluc: 85 mg/dL / Ketone: x  / Bili: x / Urobili: x   Blood: x / Protein: x / Nitrite: x   Leuk Esterase: x / RBC: x / WBC x   Sq Epi: x / Non Sq Epi: x / Bacteria: x      amylase   lipaseLipase: 13 U/L (03-01 @ 18:50)    RADIOLOGY & ADDITIONAL TESTS:

## 2024-03-04 NOTE — PROGRESS NOTE ADULT - ASSESSMENT
constipation    PLAN  reg diet  cont movantik  oob as tolerated  continue bowel regimen  will follow    Discussed with Dr. Kebede.

## 2024-03-04 NOTE — PROGRESS NOTE ADULT - SUBJECTIVE AND OBJECTIVE BOX
Patient is a 73y old  Female who presents with a chief complaint of obstipation (04 Mar 2024 08:40)    INTERVAL HPI/OVERNIGHT EVENTS:  No acute events overnight.  Per patient, she only had one small BM yesterday but per report, she had 3 large episodes.  Either way, patient this AM still feels uncomfortable in her abdomen.   Located in her left side that has now radiated throughout her abdomen.  No nausea or vomiting but has a poor appetite currently.  Still has back pain from the surgery but denies any worsening of the pain.  No numbness or weakness in her legts.        MEDICATIONS  (STANDING):  aspirin  chewable 81 milliGRAM(s) Oral daily  atorvastatin 20 milliGRAM(s) Oral at bedtime  bisacodyl 5 milliGRAM(s) Oral every 12 hours  gabapentin 100 milliGRAM(s) Oral every 12 hours  heparin   Injectable 5000 Unit(s) SubCutaneous every 8 hours  metoprolol succinate ER 25 milliGRAM(s) Oral daily  mycophenolate mofetil 500 milliGRAM(s) Oral two times a day  naloxegol 25 milliGRAM(s) Oral daily  pantoprazole    Tablet 40 milliGRAM(s) Oral before breakfast  PARoxetine 10 milliGRAM(s) Oral daily  senna 2 Tablet(s) Oral at bedtime  tacrolimus 2 milliGRAM(s) Oral at bedtime  tacrolimus 1.5 milliGRAM(s) Oral with breakfast    MEDICATIONS  (PRN):  acetaminophen     Tablet .. 650 milliGRAM(s) Oral every 6 hours PRN Temp greater or equal to 38C (100.4F), Mild Pain (1 - 3)  ALPRAZolam 0.25 milliGRAM(s) Oral daily PRN anxiety  aluminum hydroxide/magnesium hydroxide/simethicone Suspension 30 milliLiter(s) Oral every 4 hours PRN Dyspepsia  melatonin 3 milliGRAM(s) Oral at bedtime PRN Insomnia  ondansetron Injectable 4 milliGRAM(s) IV Push every 8 hours PRN Nausea and/or Vomiting  traMADol 50 milliGRAM(s) Oral every 6 hours PRN Severe Pain (7 - 10)      Allergies  No Known Allergies    ROS as above and reviewed and is otherwise negative        PHYSICAL EXAM:  Vital Signs Last 24 Hrs  T(C): 36.8 (04 Mar 2024 07:24), Max: 36.8 (04 Mar 2024 07:24)  T(F): 98.2 (04 Mar 2024 07:24), Max: 98.2 (04 Mar 2024 07:24)  HR: 100 (04 Mar 2024 07:24) (90 - 100)  BP: 135/83 (04 Mar 2024 07:24) (129/82 - 141/77)  BP(mean): --  RR: 18 (04 Mar 2024 07:24) (15 - 18)  SpO2: 97% (04 Mar 2024 07:24) (93% - 97%)    Parameters below as of 04 Mar 2024 07:24  Patient On (Oxygen Delivery Method): room air        GENERAL:     age appropriate, in NAD  HEAD:     atraumatic, normocephalic  EYES:     EOMI, conjunctiva and sclera clear  RESPIRATORY:     clear to auscultation bilaterally, no rales or rhonchi or wheezing or rubs  CARDIOVASCULAR:     regular rate and rhythm, no murmurs or rubs or gallops  GASTROINTESTINAL:     soft, nontender, nondistended, bowel sounds present  EXTREMITIES:     no clubbing or cyanosis or edema  MUSCULOSKELETAL:     no joint pain or swelling or deformities  NERVOUS SYSTEM:     motor strength intact with 5/5 B/L upper and lower extremities, no gross sensory deficits  PSYCH:     appropriate, alert and orientated x3, good concentration      LABS:                        9.6    10.06 )-----------( 314      ( 04 Mar 2024 07:49 )             30.4     04 Mar 2024 07:49    138    |  101    |  10     ----------------------------<  85     3.9     |  27     |  0.79     Ca    8.7        04 Mar 2024 07:49        Urinalysis Basic - ( 04 Mar 2024 07:49 )    Color: x / Appearance: x / SG: x / pH: x  Gluc: 85 mg/dL / Ketone: x  / Bili: x / Urobili: x   Blood: x / Protein: x / Nitrite: x   Leuk Esterase: x / RBC: x / WBC x   Sq Epi: x / Non Sq Epi: x / Bacteria: x      CAPILLARY BLOOD GLUCOSE           Patient is a 73y old  Female who presents with a chief complaint of obstipation (04 Mar 2024 08:40)    INTERVAL HPI/OVERNIGHT EVENTS:  No acute events overnight.  Per patient, she only had one small BM yesterday but per report, she had 3 large episodes.  Either way, patient this AM still feels uncomfortable in her abdomen.   Located in her left side that has now radiated throughout her abdomen.  No nausea or vomiting but has a poor appetite currently.  Still has back pain from the surgery but denies any worsening of the pain.  No numbness or weakness in her legs.        MEDICATIONS  (STANDING):  aspirin  chewable 81 milliGRAM(s) Oral daily  atorvastatin 20 milliGRAM(s) Oral at bedtime  bisacodyl 5 milliGRAM(s) Oral every 12 hours  gabapentin 100 milliGRAM(s) Oral every 12 hours  heparin   Injectable 5000 Unit(s) SubCutaneous every 8 hours  metoprolol succinate ER 25 milliGRAM(s) Oral daily  mycophenolate mofetil 500 milliGRAM(s) Oral two times a day  naloxegol 25 milliGRAM(s) Oral daily  pantoprazole    Tablet 40 milliGRAM(s) Oral before breakfast  PARoxetine 10 milliGRAM(s) Oral daily  senna 2 Tablet(s) Oral at bedtime  tacrolimus 2 milliGRAM(s) Oral at bedtime  tacrolimus 1.5 milliGRAM(s) Oral with breakfast    MEDICATIONS  (PRN):  acetaminophen     Tablet .. 650 milliGRAM(s) Oral every 6 hours PRN Temp greater or equal to 38C (100.4F), Mild Pain (1 - 3)  ALPRAZolam 0.25 milliGRAM(s) Oral daily PRN anxiety  aluminum hydroxide/magnesium hydroxide/simethicone Suspension 30 milliLiter(s) Oral every 4 hours PRN Dyspepsia  melatonin 3 milliGRAM(s) Oral at bedtime PRN Insomnia  ondansetron Injectable 4 milliGRAM(s) IV Push every 8 hours PRN Nausea and/or Vomiting  traMADol 50 milliGRAM(s) Oral every 6 hours PRN Severe Pain (7 - 10)      Allergies  No Known Allergies    ROS as above and reviewed and is otherwise negative        PHYSICAL EXAM:  Vital Signs Last 24 Hrs  T(C): 36.8 (04 Mar 2024 07:24), Max: 36.8 (04 Mar 2024 07:24)  T(F): 98.2 (04 Mar 2024 07:24), Max: 98.2 (04 Mar 2024 07:24)  HR: 100 (04 Mar 2024 07:24) (90 - 100)  BP: 135/83 (04 Mar 2024 07:24) (129/82 - 141/77)  BP(mean): --  RR: 18 (04 Mar 2024 07:24) (15 - 18)  SpO2: 97% (04 Mar 2024 07:24) (93% - 97%)    Parameters below as of 04 Mar 2024 07:24  Patient On (Oxygen Delivery Method): room air    GENERAL:     well-appearing female, resting in bed, in NAD  HEAD:     atraumatic, normocephalic  EYES:     EOMI, conjunctiva and sclera clear  RESPIRATORY:     clear to auscultation bilaterally, no rales or rhonchi or wheezing or rubs  CARDIOVASCULAR:     regular rate and rhythm, no murmurs or rubs or gallops  GASTROINTESTINAL:     soft, not tender to palpation, nondistended, bowel sounds present  EXTREMITIES:     no clubbing or cyanosis or edema  MUSCULOSKELETAL:     no joint pain or swelling or deformities  NERVOUS SYSTEM:     no sensory or motor deficits noted in BLE  PSYCH:     appropriate, alert and orientated x3, good concentration      LABS:                        9.6    10.06 )-----------( 314      ( 04 Mar 2024 07:49 )             30.4     04 Mar 2024 07:49    138    |  101    |  10     ----------------------------<  85     3.9     |  27     |  0.79     Ca    8.7        04 Mar 2024 07:49        Urinalysis Basic - ( 04 Mar 2024 07:49 )    Color: x / Appearance: x / SG: x / pH: x  Gluc: 85 mg/dL / Ketone: x  / Bili: x / Urobili: x   Blood: x / Protein: x / Nitrite: x   Leuk Esterase: x / RBC: x / WBC x   Sq Epi: x / Non Sq Epi: x / Bacteria: x      CAPILLARY BLOOD GLUCOSE

## 2024-03-04 NOTE — PROGRESS NOTE ADULT - ASSESSMENT
Obstipation likely due to opioid induced constipation -prior to admission, last reported BM was prior to spinal fusion procedure on 2/21, CT A/P with heavy stool burden, had at least one small BM on 3/3  - still feels uncomfortable -> continue with movantik and other BM regiments   - GI following   - minimize narcotics   - was taking tramadol 50 mg q4h PRN at home post surgery  - C/w dulcolax and senna  - advance diet as tolerated     Leukocytosis - improving  - could contribute from her constipation  - given current immune status was placed on zosyn, DCed 3/2  - sx consult -> for possible appendicitis appreciated, dilated appendix on CT likely related to ileus  - improving    s/p heart transplant 23ya   - c/w cellcept 500 mg bid, C/w tacrolimus 1.5 mg qam and 2 mg qpm    HTN:  -C/w metoprolol succinate 25 mg qd    HLD:  -C/w lipitor 20 mg qd     anxiety:  -C/w paxil 10 mg qd  -xanax 0.25 mg daily PRN     s/p spinal fusion with drain  - f/u with surgeon at [facility]  - having back pain this AM 3/3 -> ordered IV Acet x 1    VTE PPx - Heparin SC

## 2024-03-04 NOTE — PROGRESS NOTE ADULT - SUBJECTIVE AND OBJECTIVE BOX
CHUY BERNAL is a 73yFemale , patient examined and chart reviewed.       INTERVAL HPI/ OVERNIGHT EVENTS:   No events.  Afebrile.      PAST MEDICAL & SURGICAL HISTORY:  H/O spinal fusion  H/O heart transplant      For details regarding the patient's social history, family history, and other miscellaneous elements, please refer the initial infectious diseases consultation and/or the admitting history and physical examination for this admission.    ROS:  CONSTITUTIONAL:  Negative fever or chills  EYES:  Negative  blurry vision or double vision  CARDIOVASCULAR:  Negative for chest pain or palpitations  RESPIRATORY:  Negative for cough, wheezing, or SOB   GASTROINTESTINAL:  Negative for nausea, vomiting, diarrhea, constipation, or abdominal pain  GENITOURINARY:  Negative frequency, urgency or dysuria  NEUROLOGIC:  No headache, confusion, dizziness, lightheadedness  All other systems were reviewed and are negative     No Known Allergies      Current inpatient medications :    ANTIBIOTICS/RELEVANT:    MEDICATIONS  (STANDING):  aspirin  chewable 81 milliGRAM(s) Oral daily  atorvastatin 20 milliGRAM(s) Oral at bedtime  bisacodyl 5 milliGRAM(s) Oral every 12 hours  gabapentin 100 milliGRAM(s) Oral every 12 hours  heparin   Injectable 5000 Unit(s) SubCutaneous every 8 hours  metoprolol succinate ER 25 milliGRAM(s) Oral daily  mycophenolate mofetil 500 milliGRAM(s) Oral two times a day  naloxegol 25 milliGRAM(s) Oral daily  pantoprazole    Tablet 40 milliGRAM(s) Oral before breakfast  PARoxetine 10 milliGRAM(s) Oral daily  senna 2 Tablet(s) Oral at bedtime  tacrolimus 2 milliGRAM(s) Oral at bedtime  tacrolimus 1.5 milliGRAM(s) Oral with breakfast    MEDICATIONS  (PRN):  acetaminophen     Tablet .. 650 milliGRAM(s) Oral every 6 hours PRN Temp greater or equal to 38C (100.4F), Mild Pain (1 - 3)  ALPRAZolam 0.25 milliGRAM(s) Oral daily PRN anxiety  aluminum hydroxide/magnesium hydroxide/simethicone Suspension 30 milliLiter(s) Oral every 4 hours PRN Dyspepsia  melatonin 3 milliGRAM(s) Oral at bedtime PRN Insomnia  ondansetron Injectable 4 milliGRAM(s) IV Push every 8 hours PRN Nausea and/or Vomiting  traMADol 50 milliGRAM(s) Oral every 6 hours PRN Severe Pain (7 - 10)      Objective:  Vital Signs Last 24 Hrs  T(C): 36.8 (04 Mar 2024 07:24), Max: 36.8 (04 Mar 2024 07:24)  T(F): 98.2 (04 Mar 2024 07:24), Max: 98.2 (04 Mar 2024 07:24)  HR: 100 (04 Mar 2024 07:24) (90 - 100)  BP: 135/83 (04 Mar 2024 07:24) (129/82 - 141/77)  RR: 18 (04 Mar 2024 07:24) (15 - 18)  SpO2: 97% (04 Mar 2024 07:24) (93% - 97%)    Parameters below as of 04 Mar 2024 07:24  Patient On (Oxygen Delivery Method): room air      Physical Exam:  General:  no acute distress  Neck: supple, trachea midline  Lungs: clear, no wheeze/rhonchi  Cardiovascular: regular rate and rhythm, S1 S2  Abdomen: soft, nontender,  bowel sounds normal  Neurological: alert and oriented x3  Skin: no rash  Extremities: no edema        LABS:                        9.6    10.06 )-----------( 314      ( 04 Mar 2024 07:49 )             30.4   03-04    138  |  101  |  10  ----------------------------<  85  3.9   |  27  |  0.79    Ca    8.7      04 Mar 2024 07:49      MICROBIOLOGY:        RADIOLOGY & ADDITIONAL STUDIES:    ACC: [id number] EXAM:  CT ABDOMEN AND PELVIS OC IC   ORDERED BY: NOAH PRYOR     PROCEDURE DATE:  03/01/2024          INTERPRETATION:  CLINICAL INDICATION: abd pain, constipation, spinal   fusion and February.    PROCEDURE:  Helical axial images were obtained from the domes of the diaphragm   through the pubic symphysis following the administration of intravenous   contrast. Coronal and sagittal reformats were also obtained.    CONTRAST/COMPLICATIONS:  IV Contrast: Omnipaque 350  90 cc administered   10 cc discarded  Oral Contrast: Omnipaque 300  Complications: None reported at time of study completion    COMPARISON: None.    FINDINGS:    LOWER CHEST: Atelectasis.    LIVER: No obvious lesion.  BILE DUCTS/GALLBLADDER: No intrahepaticbiliary dilatation. Common bile   duct dilatation, reflecting postcholecystectomy state.  PANCREAS: Fatty atrophy.  SPLEEN: No obvious lesion.    ADRENALS: Unremarkable.  KIDNEYS/URETERS: No hydronephrosis, hydroureter or significant   perinephric stranding. Question striated bilateral nephrogram. A 3.5 x   4.0 cm left renal cyst. No radiopaque urinary tract stone. Subcentimeter   hypodensities, too small to characterize.  BLADDER: Degraded by artifact. Partially distended.  REPRODUCTIVE ORGANS: Degraded by artifact. Anteverted uterus.    BOWEL: Dilated fecalized distal/terminal ileum with mild surrounding   stranding. No discrete transition to suggest bowel obstruction.   Borderline dilated proximal/mid appendix tapering distally. Colon   diverticulosis. A moderate amount of stool throughout the colon.  PERITONEUM: No organized fluid collection or free air.  VESSELS: Atherosclerosis. Normal caliber of the abdominal aorta.  RETROPERITONEUM/LYMPH NODE: No lymphadenopathy.  ABDOMINAL WALL/SOFT TISSUES: Stranding in the paraspinal soft tissue. A   13.0 x 1.8 x 23.0 cm minimally rim-enhancing fluid collection in the   paraspinal superficial soft tissue (3:43 and 4:82), with a drain in   place. Question 9.0 x 3.0 x 13.0 cm fluid collectionin the deeper   paraspinal muscle (3:57 and 4:78). Suboptimal evaluation of the spinal   canal/neural foramina. Minimal stranding in the prevertebral soft tissue.  BONES: Degenerative changes of the spine. Posterior fixation of the   thoracolumbar spine (and sacroiliac bones. Right total hip arthroplasty.   Hardware artifact degrading images.    IMPRESSION:    A moderate amount of stool throughout the colon, especially right colon   and distal/terminal ileum. Dilated fecalized distal/terminal ileum with   mild surrounding stranding, which may be due to incompetent ileocecal   valve and reflux of cecal contents. No discrete transition to suggest   bowel obstruction.    Borderline dilated proximal/mid appendix tapering distally without   significant inflammatory change. Recommend clinical correlation.    Question striated bilateral nephrogram. Recommend clinical correlation to   assess urinary tract infection.    Postsurgical changes in the spine. Fluid collections with stranding in   the paraspinal soft tissue as described, with a drain in place.   Suboptimal evaluation of the spinal canal/neural foramina. Recommend   clinical correlation to assess infection. Recommend comparison to   previous outside study and follow-up (MR) as indicated.      Assessment :   74YO F PMH of heart transplant 23 years ago on cellcept and tacrolimus, anxiety, HTN, HLD, constipation, s/p spinal fusion sx at  [facility] on 2/21 discharged on 2/26th admitted with abdominal distention/discomfort sec constipation.   CT abd/pel showed mod amount of stool burden and Post surgical changes. WBC 15K   Constipation   UA neg  Leukocytosis reactive resolved  + BMs  No signs/symptoms for infectious process  Clinically better    Plan :   Monitor off antibiotics  Trend temps and cbc  Surgery and GI on case  Bowel regiment  Pulm toileting  Increase activity  Stable from ID standpoint  Dc planning per primary team    Continue with present regiment.  Appropriate use of antibiotics and adverse effects reviewed.    > 35 minutes were spent in direct patient care reviewing notes, medications ,labs data/ imaging , discussion with multidisciplinary team.    Thank you for allowing me to participate in care of your patient .    Hortensia Sequeira MD  Infectious Disease  [phone]

## 2024-03-05 LAB
ANION GAP SERPL CALC-SCNC: 8 MMOL/L — SIGNIFICANT CHANGE UP (ref 5–17)
BUN SERPL-MCNC: 9 MG/DL — SIGNIFICANT CHANGE UP (ref 7–23)
CALCIUM SERPL-MCNC: 9 MG/DL — SIGNIFICANT CHANGE UP (ref 8.4–10.5)
CHLORIDE SERPL-SCNC: 100 MMOL/L — SIGNIFICANT CHANGE UP (ref 96–108)
CO2 SERPL-SCNC: 30 MMOL/L — SIGNIFICANT CHANGE UP (ref 22–31)
CREAT SERPL-MCNC: 0.79 MG/DL — SIGNIFICANT CHANGE UP (ref 0.5–1.3)
EGFR: 79 ML/MIN/1.73M2 — SIGNIFICANT CHANGE UP
GLUCOSE SERPL-MCNC: 90 MG/DL — SIGNIFICANT CHANGE UP (ref 70–99)
HCT VFR BLD CALC: 31.2 % — LOW (ref 34.5–45)
HGB BLD-MCNC: 9.8 G/DL — LOW (ref 11.5–15.5)
MAGNESIUM SERPL-MCNC: 1.8 MG/DL — SIGNIFICANT CHANGE UP (ref 1.6–2.6)
MCHC RBC-ENTMCNC: 28.4 PG — SIGNIFICANT CHANGE UP (ref 27–34)
MCHC RBC-ENTMCNC: 31.4 GM/DL — LOW (ref 32–36)
MCV RBC AUTO: 90.4 FL — SIGNIFICANT CHANGE UP (ref 80–100)
NRBC # BLD: 0 /100 WBCS — SIGNIFICANT CHANGE UP (ref 0–0)
PHOSPHATE SERPL-MCNC: 3.3 MG/DL — SIGNIFICANT CHANGE UP (ref 2.5–4.5)
PLATELET # BLD AUTO: 335 K/UL — SIGNIFICANT CHANGE UP (ref 150–400)
POTASSIUM SERPL-MCNC: 3.2 MMOL/L — LOW (ref 3.5–5.3)
POTASSIUM SERPL-SCNC: 3.2 MMOL/L — LOW (ref 3.5–5.3)
RBC # BLD: 3.45 M/UL — LOW (ref 3.8–5.2)
RBC # FLD: 16.2 % — HIGH (ref 10.3–14.5)
SODIUM SERPL-SCNC: 138 MMOL/L — SIGNIFICANT CHANGE UP (ref 135–145)
WBC # BLD: 8.87 K/UL — SIGNIFICANT CHANGE UP (ref 3.8–10.5)
WBC # FLD AUTO: 8.87 K/UL — SIGNIFICANT CHANGE UP (ref 3.8–10.5)

## 2024-03-05 PROCEDURE — 99232 SBSQ HOSP IP/OBS MODERATE 35: CPT

## 2024-03-05 RX ORDER — BENZOCAINE AND MENTHOL 5; 1 G/100ML; G/100ML
1 LIQUID ORAL
Refills: 0 | Status: DISCONTINUED | OUTPATIENT
Start: 2024-03-05 | End: 2024-03-06

## 2024-03-05 RX ADMIN — Medication 25 MILLIGRAM(S): at 05:21

## 2024-03-05 RX ADMIN — Medication 81 MILLIGRAM(S): at 14:07

## 2024-03-05 RX ADMIN — MYCOPHENOLATE MOFETIL 500 MILLIGRAM(S): 250 CAPSULE ORAL at 18:04

## 2024-03-05 RX ADMIN — GABAPENTIN 100 MILLIGRAM(S): 400 CAPSULE ORAL at 05:22

## 2024-03-05 RX ADMIN — TRAMADOL HYDROCHLORIDE 50 MILLIGRAM(S): 50 TABLET ORAL at 14:07

## 2024-03-05 RX ADMIN — HEPARIN SODIUM 5000 UNIT(S): 5000 INJECTION INTRAVENOUS; SUBCUTANEOUS at 14:06

## 2024-03-05 RX ADMIN — TRAMADOL HYDROCHLORIDE 50 MILLIGRAM(S): 50 TABLET ORAL at 15:07

## 2024-03-05 RX ADMIN — BENZOCAINE AND MENTHOL 1 LOZENGE: 5; 1 LIQUID ORAL at 21:31

## 2024-03-05 RX ADMIN — Medication 650 MILLIGRAM(S): at 21:31

## 2024-03-05 RX ADMIN — PANTOPRAZOLE SODIUM 40 MILLIGRAM(S): 20 TABLET, DELAYED RELEASE ORAL at 05:22

## 2024-03-05 RX ADMIN — TACROLIMUS 2 MILLIGRAM(S): 5 CAPSULE ORAL at 21:28

## 2024-03-05 RX ADMIN — GABAPENTIN 100 MILLIGRAM(S): 400 CAPSULE ORAL at 18:05

## 2024-03-05 RX ADMIN — HEPARIN SODIUM 5000 UNIT(S): 5000 INJECTION INTRAVENOUS; SUBCUTANEOUS at 21:28

## 2024-03-05 RX ADMIN — Medication 650 MILLIGRAM(S): at 22:31

## 2024-03-05 RX ADMIN — NALOXEGOL OXALATE 25 MILLIGRAM(S): 12.5 TABLET, FILM COATED ORAL at 14:07

## 2024-03-05 RX ADMIN — MYCOPHENOLATE MOFETIL 500 MILLIGRAM(S): 250 CAPSULE ORAL at 05:22

## 2024-03-05 RX ADMIN — Medication 0.25 MILLIGRAM(S): at 21:31

## 2024-03-05 RX ADMIN — SENNA PLUS 2 TABLET(S): 8.6 TABLET ORAL at 21:28

## 2024-03-05 RX ADMIN — HEPARIN SODIUM 5000 UNIT(S): 5000 INJECTION INTRAVENOUS; SUBCUTANEOUS at 05:22

## 2024-03-05 RX ADMIN — BENZOCAINE AND MENTHOL 1 LOZENGE: 5; 1 LIQUID ORAL at 06:48

## 2024-03-05 RX ADMIN — Medication 10 MILLIGRAM(S): at 14:07

## 2024-03-05 RX ADMIN — TACROLIMUS 1.5 MILLIGRAM(S): 5 CAPSULE ORAL at 08:34

## 2024-03-05 RX ADMIN — ATORVASTATIN CALCIUM 20 MILLIGRAM(S): 80 TABLET, FILM COATED ORAL at 21:28

## 2024-03-05 RX ADMIN — Medication 5 MILLIGRAM(S): at 18:04

## 2024-03-05 NOTE — CHART NOTE - NSCHARTNOTEFT_GEN_A_CORE
Spoke to patient regarding her leaking JOHN drain.  She said that her drain was leaking at home before admission and she notified her surgeon who said to just follow up with them.
Consulted Surgery Dr. Freeman for dilated appendix on CT along with leukocytosis/lower abd discomfort.  GI consulted as well for obstipation.  Maintain Zosyn for now.

## 2024-03-05 NOTE — PROGRESS NOTE ADULT - SUBJECTIVE AND OBJECTIVE BOX
VULINCHUY is a 73yFemale , patient examined and chart reviewed.       INTERVAL HPI/ OVERNIGHT EVENTS:   No events. Mild drainage from back surgical site.  Afebrile. NAD.      PAST MEDICAL & SURGICAL HISTORY:  H/O spinal fusion  H/O heart transplant      For details regarding the patient's social history, family history, and other miscellaneous elements, please refer the initial infectious diseases consultation and/or the admitting history and physical examination for this admission.    ROS:  CONSTITUTIONAL:  Negative fever or chills  EYES:  Negative  blurry vision or double vision  CARDIOVASCULAR:  Negative for chest pain or palpitations  RESPIRATORY:  Negative for cough, wheezing, or SOB   GASTROINTESTINAL:  Negative for nausea, vomiting, diarrhea, constipation, or abdominal pain  GENITOURINARY:  Negative frequency, urgency or dysuria  NEUROLOGIC:  No headache, confusion, dizziness, lightheadedness  All other systems were reviewed and are negative     No Known Allergies      Current inpatient medications :    ANTIBIOTICS/RELEVANT:    MEDICATIONS  (STANDING):  aspirin  chewable 81 milliGRAM(s) Oral daily  atorvastatin 20 milliGRAM(s) Oral at bedtime  bisacodyl 5 milliGRAM(s) Oral every 12 hours  gabapentin 100 milliGRAM(s) Oral every 12 hours  heparin   Injectable 5000 Unit(s) SubCutaneous every 8 hours  metoprolol succinate ER 25 milliGRAM(s) Oral daily  mycophenolate mofetil 500 milliGRAM(s) Oral two times a day  naloxegol 25 milliGRAM(s) Oral daily  pantoprazole    Tablet 40 milliGRAM(s) Oral before breakfast  PARoxetine 10 milliGRAM(s) Oral daily  senna 2 Tablet(s) Oral at bedtime  tacrolimus 2 milliGRAM(s) Oral at bedtime  tacrolimus 1.5 milliGRAM(s) Oral with breakfast    MEDICATIONS  (PRN):  acetaminophen     Tablet .. 650 milliGRAM(s) Oral every 6 hours PRN Temp greater or equal to 38C (100.4F), Mild Pain (1 - 3)  ALPRAZolam 0.25 milliGRAM(s) Oral daily PRN anxiety  aluminum hydroxide/magnesium hydroxide/simethicone Suspension 30 milliLiter(s) Oral every 4 hours PRN Dyspepsia  benzocaine/menthol Lozenge 1 Lozenge Oral every 3 hours PRN Sore Throat  melatonin 3 milliGRAM(s) Oral at bedtime PRN Insomnia  ondansetron Injectable 4 milliGRAM(s) IV Push every 8 hours PRN Nausea and/or Vomiting  traMADol 50 milliGRAM(s) Oral every 6 hours PRN Severe Pain (7 - 10)      Objective:  Vital Signs Last 24 Hrs  T(C): 36.7 (05 Mar 2024 07:53), Max: 36.9 (04 Mar 2024 23:20)  T(F): 98 (05 Mar 2024 07:53), Max: 98.4 (04 Mar 2024 23:20)  HR: 93 (05 Mar 2024 07:53) (93 - 97)  BP: 127/76 (05 Mar 2024 07:53) (110/74 - 127/76)  RR: 16 (05 Mar 2024 07:53) (16 - 18)  SpO2: 95% (05 Mar 2024 07:53) (95% - 100%)    Parameters below as of 05 Mar 2024 07:53  Patient On (Oxygen Delivery Method): room air    Physical Exam:  General:  no acute distress  Neck: supple, trachea midline  Lungs: clear, no wheeze/rhonchi  Cardiovascular: regular rate and rhythm, S1 S2  Abdomen: soft, nontender,  bowel sounds normal  Neurological: alert and oriented x3  Skin: no rash  Back surgical site c/d/i +JOHN drain  Extremities: no edema      LABS:                        9.8    8.87  )-----------( 335      ( 05 Mar 2024 08:17 )             31.2   03-05    138  |  100  |  9   ----------------------------<  90  3.2<L>   |  30  |  0.79    Ca    9.0      05 Mar 2024 08:17  Phos  3.3     03-05  Mg     1.8     03-05    MICROBIOLOGY:        RADIOLOGY & ADDITIONAL STUDIES:    ACC: [id number] EXAM:  CT ABDOMEN AND PELVIS OC IC   ORDERED BY: NOAH PRYOR     PROCEDURE DATE:  03/01/2024          INTERPRETATION:  CLINICAL INDICATION: abd pain, constipation, spinal   fusion and February.    PROCEDURE:  Helical axial images were obtained from the domes of the diaphragm   through the pubic symphysis following the administration of intravenous   contrast. Coronal and sagittal reformats were also obtained.    CONTRAST/COMPLICATIONS:  IV Contrast: Omnipaque 350  90 cc administered   10 cc discarded  Oral Contrast: Omnipaque 300  Complications: None reported at time of study completion    COMPARISON: None.    FINDINGS:    LOWER CHEST: Atelectasis.    LIVER: No obvious lesion.  BILE DUCTS/GALLBLADDER: No intrahepaticbiliary dilatation. Common bile   duct dilatation, reflecting postcholecystectomy state.  PANCREAS: Fatty atrophy.  SPLEEN: No obvious lesion.    ADRENALS: Unremarkable.  KIDNEYS/URETERS: No hydronephrosis, hydroureter or significant   perinephric stranding. Question striated bilateral nephrogram. A 3.5 x   4.0 cm left renal cyst. No radiopaque urinary tract stone. Subcentimeter   hypodensities, too small to characterize.  BLADDER: Degraded by artifact. Partially distended.  REPRODUCTIVE ORGANS: Degraded by artifact. Anteverted uterus.    BOWEL: Dilated fecalized distal/terminal ileum with mild surrounding   stranding. No discrete transition to suggest bowel obstruction.   Borderline dilated proximal/mid appendix tapering distally. Colon   diverticulosis. A moderate amount of stool throughout the colon.  PERITONEUM: No organized fluid collection or free air.  VESSELS: Atherosclerosis. Normal caliber of the abdominal aorta.  RETROPERITONEUM/LYMPH NODE: No lymphadenopathy.  ABDOMINAL WALL/SOFT TISSUES: Stranding in the paraspinal soft tissue. A   13.0 x 1.8 x 23.0 cm minimally rim-enhancing fluid collection in the   paraspinal superficial soft tissue (3:43 and 4:82), with a drain in   place. Question 9.0 x 3.0 x 13.0 cm fluid collectionin the deeper   paraspinal muscle (3:57 and 4:78). Suboptimal evaluation of the spinal   canal/neural foramina. Minimal stranding in the prevertebral soft tissue.  BONES: Degenerative changes of the spine. Posterior fixation of the   thoracolumbar spine (and sacroiliac bones. Right total hip arthroplasty.   Hardware artifact degrading images.    IMPRESSION:    A moderate amount of stool throughout the colon, especially right colon   and distal/terminal ileum. Dilated fecalized distal/terminal ileum with   mild surrounding stranding, which may be due to incompetent ileocecal   valve and reflux of cecal contents. No discrete transition to suggest   bowel obstruction.    Borderline dilated proximal/mid appendix tapering distally without   significant inflammatory change. Recommend clinical correlation.    Question striated bilateral nephrogram. Recommend clinical correlation to   assess urinary tract infection.    Postsurgical changes in the spine. Fluid collections with stranding in   the paraspinal soft tissue as described, with a drain in place.   Suboptimal evaluation of the spinal canal/neural foramina. Recommend   clinical correlation to assess infection. Recommend comparison to   previous outside study and follow-up (MR) as indicated.      Assessment :   72YO F PMH of heart transplant 23 years ago on cellcept and tacrolimus, anxiety, HTN, HLD, constipation, s/p spinal fusion sx at  [facility] on 2/21 discharged on 2/26th admitted with abdominal distention/discomfort sec constipation.   CT abd/pel showed mod amount of stool burden and Post surgical changes. WBC 15K   Constipation   UA neg  Leukocytosis reactive resolved  + BMs  No signs/symptoms for infectious process  Clinically better    Plan :   Monitor off antibiotics  Trend temps and cbc  Surgery and GI on case  Bowel regiment  Pulm toileting  Increase activity  Stable from ID standpoint  Dc planning per primary team  Will sign off case    Continue with present regiment.  Appropriate use of antibiotics and adverse effects reviewed.    > 35 minutes were spent in direct patient care reviewing notes, medications ,labs data/ imaging , discussion with multidisciplinary team.    Thank you for allowing me to participate in care of your patient .    Hortensia Sequeira MD  Infectious Disease  [phone]

## 2024-03-05 NOTE — PROGRESS NOTE ADULT - SUBJECTIVE AND OBJECTIVE BOX
INTERVAL HPI/OVERNIGHT EVENTS:  HPI:  No new overnight event.  No N/V/D.  Had 3 soft BMs yesterday. Reports she feels "better", appetite has improved as well. Tolerating diet.     MEDICATIONS  (STANDING):  aspirin  chewable 81 milliGRAM(s) Oral daily  atorvastatin 20 milliGRAM(s) Oral at bedtime  bisacodyl 5 milliGRAM(s) Oral every 12 hours  gabapentin 100 milliGRAM(s) Oral every 12 hours  heparin   Injectable 5000 Unit(s) SubCutaneous every 8 hours  metoprolol succinate ER 25 milliGRAM(s) Oral daily  mycophenolate mofetil 500 milliGRAM(s) Oral two times a day  naloxegol 25 milliGRAM(s) Oral daily  pantoprazole    Tablet 40 milliGRAM(s) Oral before breakfast  PARoxetine 10 milliGRAM(s) Oral daily  senna 2 Tablet(s) Oral at bedtime  tacrolimus 2 milliGRAM(s) Oral at bedtime  tacrolimus 1.5 milliGRAM(s) Oral with breakfast    MEDICATIONS  (PRN):  acetaminophen     Tablet .. 650 milliGRAM(s) Oral every 6 hours PRN Temp greater or equal to 38C (100.4F), Mild Pain (1 - 3)  ALPRAZolam 0.25 milliGRAM(s) Oral daily PRN anxiety  aluminum hydroxide/magnesium hydroxide/simethicone Suspension 30 milliLiter(s) Oral every 4 hours PRN Dyspepsia  benzocaine/menthol Lozenge 1 Lozenge Oral every 3 hours PRN Sore Throat  melatonin 3 milliGRAM(s) Oral at bedtime PRN Insomnia  ondansetron Injectable 4 milliGRAM(s) IV Push every 8 hours PRN Nausea and/or Vomiting  traMADol 50 milliGRAM(s) Oral every 6 hours PRN Severe Pain (7 - 10)      Allergies    No Known Allergies    Intolerances          General:  No wt loss, fevers, chills, night sweats, fatigue,   Eyes:  Good vision, no reported pain  ENT:  No sore throat, pain, runny nose, dysphagia  CV:  No pain, palpitations, hypo/hypertension  Resp:  No dyspnea, cough, tachypnea, wheezing  GI: see HPI  :  No pain, bleeding, incontinence, nocturia  Muscle:  No pain, weakness  Neuro:  No weakness, tingling, memory problems  Psych:  No fatigue, insomnia, mood problems, depression  Endocrine:  No polyuria, polydipsia, cold/heat intolerance  Heme:  No petechiae, ecchymosis, easy bruisability  Skin:  No rash, tattoos, scars, edema      PHYSICAL EXAM:   Vital Signs:  Vital Signs Last 24 Hrs  T(C): 36.7 (05 Mar 2024 07:53), Max: 36.9 (04 Mar 2024 23:20)  T(F): 98 (05 Mar 2024 07:53), Max: 98.4 (04 Mar 2024 23:20)  HR: 93 (05 Mar 2024 07:53) (93 - 97)  BP: 127/76 (05 Mar 2024 07:53) (110/74 - 127/76)  BP(mean): --  RR: 16 (05 Mar 2024 07:53) (16 - 18)  SpO2: 95% (05 Mar 2024 07:53) (95% - 100%)    Parameters below as of 05 Mar 2024 07:53  Patient On (Oxygen Delivery Method): room air      Daily     Daily I&O's Summary    04 Mar 2024 07:01  -  05 Mar 2024 07:00  --------------------------------------------------------  IN: 0 mL / OUT: 5 mL / NET: -5 mL        GENERAL:  Appears stated age, well-groomed, well-nourished, no distress  HEENT:  NC/AT,  conjunctivae clear and pink, no thyromegaly, nodules, adenopathy, no JVD, sclera -anicteric  CHEST:  Full & symmetric excursion, no increased effort, breath sounds clear  HEART:  Regular rhythm, S1, S2, no murmur/rub/S3/S4, no abdominal bruit, no edema  ABDOMEN:  Soft, non tender, non-distended, normoactive bowel sounds,  no masses ,no hepato-splenomegaly, no signs of chronic liver disease  EXTEREMITIES:  no cyanosis,clubbing or edema  SKIN:  No rash/erythema/ecchymoses/petechiae/wounds/abscess/warm/dry  NEURO:  Alert, oriented, no asterixis, no tremor, no encephalopathy      LABS:                        9.8    8.87  )-----------( 335      ( 05 Mar 2024 08:17 )             31.2     03-05    138  |  100  |  9   ----------------------------<  90  3.2<L>   |  30  |  0.79    Ca    9.0      05 Mar 2024 08:17        Urinalysis Basic - ( 05 Mar 2024 08:17 )    Color: x / Appearance: x / SG: x / pH: x  Gluc: 90 mg/dL / Ketone: x  / Bili: x / Urobili: x   Blood: x / Protein: x / Nitrite: x   Leuk Esterase: x / RBC: x / WBC x   Sq Epi: x / Non Sq Epi: x / Bacteria: x      amylase   lipaseLipase: 13 U/L (03-01 @ 18:50)    RADIOLOGY & ADDITIONAL TESTS:

## 2024-03-05 NOTE — PROGRESS NOTE ADULT - ASSESSMENT
constipation likely opioid induced    PLAN  reg diet  continue movantik  oob as tolerated  continue bowel regimen  continue PO as tolerated  will follow    Discussed with Dr. Kebede.

## 2024-03-05 NOTE — PROGRESS NOTE ADULT - SUBJECTIVE AND OBJECTIVE BOX
Patient is a 73y old  Female who presents with a chief complaint of obstipation (05 Mar 2024 08:49)    INTERVAL HPI/OVERNIGHT EVENTS:  No acute events overnight.  This AM, patient reports having a small formed BM.  Abdominal pain improved but still present.  Still across her abdomen, starting from her left and radiating across.  No nausea/vomiting.  Tolerating PO.      MEDICATIONS  (STANDING):  aspirin  chewable 81 milliGRAM(s) Oral daily  atorvastatin 20 milliGRAM(s) Oral at bedtime  bisacodyl 5 milliGRAM(s) Oral every 12 hours  gabapentin 100 milliGRAM(s) Oral every 12 hours  heparin   Injectable 5000 Unit(s) SubCutaneous every 8 hours  metoprolol succinate ER 25 milliGRAM(s) Oral daily  mycophenolate mofetil 500 milliGRAM(s) Oral two times a day  naloxegol 25 milliGRAM(s) Oral daily  pantoprazole    Tablet 40 milliGRAM(s) Oral before breakfast  PARoxetine 10 milliGRAM(s) Oral daily  senna 2 Tablet(s) Oral at bedtime  tacrolimus 2 milliGRAM(s) Oral at bedtime  tacrolimus 1.5 milliGRAM(s) Oral with breakfast    MEDICATIONS  (PRN):  acetaminophen     Tablet .. 650 milliGRAM(s) Oral every 6 hours PRN Temp greater or equal to 38C (100.4F), Mild Pain (1 - 3)  ALPRAZolam 0.25 milliGRAM(s) Oral daily PRN anxiety  aluminum hydroxide/magnesium hydroxide/simethicone Suspension 30 milliLiter(s) Oral every 4 hours PRN Dyspepsia  benzocaine/menthol Lozenge 1 Lozenge Oral every 3 hours PRN Sore Throat  melatonin 3 milliGRAM(s) Oral at bedtime PRN Insomnia  ondansetron Injectable 4 milliGRAM(s) IV Push every 8 hours PRN Nausea and/or Vomiting  traMADol 50 milliGRAM(s) Oral every 6 hours PRN Severe Pain (7 - 10)      Allergies  No Known Allergies    ROS as above and reviewed and is otherwise negative    PHYSICAL EXAM:  Vital Signs Last 24 Hrs  T(C): 36.7 (05 Mar 2024 07:53), Max: 36.9 (04 Mar 2024 23:20)  T(F): 98 (05 Mar 2024 07:53), Max: 98.4 (04 Mar 2024 23:20)  HR: 93 (05 Mar 2024 07:53) (93 - 97)  BP: 127/76 (05 Mar 2024 07:53) (110/74 - 127/76)  BP(mean): --  RR: 16 (05 Mar 2024 07:53) (16 - 18)  SpO2: 95% (05 Mar 2024 07:53) (95% - 100%)    Parameters below as of 05 Mar 2024 07:53  Patient On (Oxygen Delivery Method): room air    GENERAL:     well-appearing female, resting in bed, in NAD  HEAD:     atraumatic, normocephalic  EYES:     EOMI, conjunctiva and sclera clear  RESPIRATORY:     clear to auscultation bilaterally, no rales or rhonchi or wheezing or rubs  CARDIOVASCULAR:     regular rate and rhythm, no murmurs or rubs or gallops  GASTROINTESTINAL:     soft, still not tender to palpation, nondistended, bowel sounds present  EXTREMITIES:     no clubbing or cyanosis or edema  MUSCULOSKELETAL:     no joint pain or swelling or deformities  NERVOUS SYSTEM:     no sensory or motor deficits noted in BLE  PSYCH:     appropriate, alert and orientated x3, good concentration      LABS:                        9.8    8.87  )-----------( 335      ( 05 Mar 2024 08:17 )             31.2     05 Mar 2024 08:17    138    |  100    |  9      ----------------------------<  90     3.2     |  30     |  0.79     Ca    9.0        05 Mar 2024 08:17  Phos  3.3       05 Mar 2024 08:17  Mg     1.8       05 Mar 2024 08:17        Urinalysis Basic - ( 05 Mar 2024 08:17 )    Color: x / Appearance: x / SG: x / pH: x  Gluc: 90 mg/dL / Ketone: x  / Bili: x / Urobili: x   Blood: x / Protein: x / Nitrite: x   Leuk Esterase: x / RBC: x / WBC x   Sq Epi: x / Non Sq Epi: x / Bacteria: x      CAPILLARY BLOOD GLUCOSE           Patient is a 73y old  Female who presents with a chief complaint of obstipation (05 Mar 2024 08:49)    INTERVAL HPI/OVERNIGHT EVENTS:  No acute events overnight.  This AM, patient reports having a small formed BM.  Abdominal pain improved but still present.  Still across her abdomen, starting from her left and radiating across.  No nausea/vomiting.  Tolerating PO.  Per RN, back dressing and mary ann noted to be soaked.      MEDICATIONS  (STANDING):  aspirin  chewable 81 milliGRAM(s) Oral daily  atorvastatin 20 milliGRAM(s) Oral at bedtime  bisacodyl 5 milliGRAM(s) Oral every 12 hours  gabapentin 100 milliGRAM(s) Oral every 12 hours  heparin   Injectable 5000 Unit(s) SubCutaneous every 8 hours  metoprolol succinate ER 25 milliGRAM(s) Oral daily  mycophenolate mofetil 500 milliGRAM(s) Oral two times a day  naloxegol 25 milliGRAM(s) Oral daily  pantoprazole    Tablet 40 milliGRAM(s) Oral before breakfast  PARoxetine 10 milliGRAM(s) Oral daily  senna 2 Tablet(s) Oral at bedtime  tacrolimus 2 milliGRAM(s) Oral at bedtime  tacrolimus 1.5 milliGRAM(s) Oral with breakfast    MEDICATIONS  (PRN):  acetaminophen     Tablet .. 650 milliGRAM(s) Oral every 6 hours PRN Temp greater or equal to 38C (100.4F), Mild Pain (1 - 3)  ALPRAZolam 0.25 milliGRAM(s) Oral daily PRN anxiety  aluminum hydroxide/magnesium hydroxide/simethicone Suspension 30 milliLiter(s) Oral every 4 hours PRN Dyspepsia  benzocaine/menthol Lozenge 1 Lozenge Oral every 3 hours PRN Sore Throat  melatonin 3 milliGRAM(s) Oral at bedtime PRN Insomnia  ondansetron Injectable 4 milliGRAM(s) IV Push every 8 hours PRN Nausea and/or Vomiting  traMADol 50 milliGRAM(s) Oral every 6 hours PRN Severe Pain (7 - 10)      Allergies  No Known Allergies    ROS as above and reviewed and is otherwise negative    PHYSICAL EXAM:  Vital Signs Last 24 Hrs  T(C): 36.7 (05 Mar 2024 07:53), Max: 36.9 (04 Mar 2024 23:20)  T(F): 98 (05 Mar 2024 07:53), Max: 98.4 (04 Mar 2024 23:20)  HR: 93 (05 Mar 2024 07:53) (93 - 97)  BP: 127/76 (05 Mar 2024 07:53) (110/74 - 127/76)  BP(mean): --  RR: 16 (05 Mar 2024 07:53) (16 - 18)  SpO2: 95% (05 Mar 2024 07:53) (95% - 100%)    Parameters below as of 05 Mar 2024 07:53  Patient On (Oxygen Delivery Method): room air    GENERAL:     well-appearing female, resting in bed, in NAD  HEAD:     atraumatic, normocephalic  EYES:     EOMI, conjunctiva and sclera clear  RESPIRATORY:     clear to auscultation bilaterally, no rales or rhonchi or wheezing or rubs  CARDIOVASCULAR:     regular rate and rhythm, no murmurs or rubs or gallops  GASTROINTESTINAL:     soft, still not tender to palpation, nondistended, bowel sounds present  EXTREMITIES:     no clubbing or cyanosis or edema  MUSCULOSKELETAL:     no joint pain or swelling or deformities  BACK:  dressing wet, scant drainage in JOHN drain  NERVOUS SYSTEM:     no sensory or motor deficits noted in BLE  PSYCH:     appropriate, alert and orientated x3, good concentration    LABS:                        9.8    8.87  )-----------( 335      ( 05 Mar 2024 08:17 )             31.2     05 Mar 2024 08:17    138    |  100    |  9      ----------------------------<  90     3.2     |  30     |  0.79     Ca    9.0        05 Mar 2024 08:17  Phos  3.3       05 Mar 2024 08:17  Mg     1.8       05 Mar 2024 08:17        Urinalysis Basic - ( 05 Mar 2024 08:17 )    Color: x / Appearance: x / SG: x / pH: x  Gluc: 90 mg/dL / Ketone: x  / Bili: x / Urobili: x   Blood: x / Protein: x / Nitrite: x   Leuk Esterase: x / RBC: x / WBC x   Sq Epi: x / Non Sq Epi: x / Bacteria: x      CAPILLARY BLOOD GLUCOSE

## 2024-03-06 ENCOUNTER — TRANSCRIPTION ENCOUNTER (OUTPATIENT)
Age: 74
End: 2024-03-06

## 2024-03-06 VITALS
TEMPERATURE: 98 F | RESPIRATION RATE: 16 BRPM | OXYGEN SATURATION: 93 % | HEART RATE: 97 BPM | DIASTOLIC BLOOD PRESSURE: 86 MMHG | SYSTOLIC BLOOD PRESSURE: 147 MMHG

## 2024-03-06 LAB
ALBUMIN SERPL ELPH-MCNC: 2.5 G/DL — LOW (ref 3.3–5)
ALP SERPL-CCNC: 65 U/L — SIGNIFICANT CHANGE UP (ref 30–120)
ALT FLD-CCNC: 23 U/L — SIGNIFICANT CHANGE UP (ref 10–60)
ANION GAP SERPL CALC-SCNC: 10 MMOL/L — SIGNIFICANT CHANGE UP (ref 5–17)
AST SERPL-CCNC: 28 U/L — SIGNIFICANT CHANGE UP (ref 10–40)
BASOPHILS # BLD AUTO: 0.05 K/UL — SIGNIFICANT CHANGE UP (ref 0–0.2)
BASOPHILS NFR BLD AUTO: 0.6 % — SIGNIFICANT CHANGE UP (ref 0–2)
BILIRUB SERPL-MCNC: 0.4 MG/DL — SIGNIFICANT CHANGE UP (ref 0.2–1.2)
BUN SERPL-MCNC: 10 MG/DL — SIGNIFICANT CHANGE UP (ref 7–23)
CALCIUM SERPL-MCNC: 9.1 MG/DL — SIGNIFICANT CHANGE UP (ref 8.4–10.5)
CHLORIDE SERPL-SCNC: 101 MMOL/L — SIGNIFICANT CHANGE UP (ref 96–108)
CO2 SERPL-SCNC: 28 MMOL/L — SIGNIFICANT CHANGE UP (ref 22–31)
CREAT SERPL-MCNC: 0.78 MG/DL — SIGNIFICANT CHANGE UP (ref 0.5–1.3)
EGFR: 80 ML/MIN/1.73M2 — SIGNIFICANT CHANGE UP
EOSINOPHIL # BLD AUTO: 0.14 K/UL — SIGNIFICANT CHANGE UP (ref 0–0.5)
EOSINOPHIL NFR BLD AUTO: 1.7 % — SIGNIFICANT CHANGE UP (ref 0–6)
GLUCOSE SERPL-MCNC: 91 MG/DL — SIGNIFICANT CHANGE UP (ref 70–99)
HCT VFR BLD CALC: 33.9 % — LOW (ref 34.5–45)
HGB BLD-MCNC: 10.5 G/DL — LOW (ref 11.5–15.5)
IMM GRANULOCYTES NFR BLD AUTO: 2 % — HIGH (ref 0–0.9)
LYMPHOCYTES # BLD AUTO: 1.01 K/UL — SIGNIFICANT CHANGE UP (ref 1–3.3)
LYMPHOCYTES # BLD AUTO: 12.5 % — LOW (ref 13–44)
MAGNESIUM SERPL-MCNC: 1.7 MG/DL — SIGNIFICANT CHANGE UP (ref 1.6–2.6)
MCHC RBC-ENTMCNC: 27.9 PG — SIGNIFICANT CHANGE UP (ref 27–34)
MCHC RBC-ENTMCNC: 31 GM/DL — LOW (ref 32–36)
MCV RBC AUTO: 89.9 FL — SIGNIFICANT CHANGE UP (ref 80–100)
MONOCYTES # BLD AUTO: 0.61 K/UL — SIGNIFICANT CHANGE UP (ref 0–0.9)
MONOCYTES NFR BLD AUTO: 7.6 % — SIGNIFICANT CHANGE UP (ref 2–14)
NEUTROPHILS # BLD AUTO: 6.1 K/UL — SIGNIFICANT CHANGE UP (ref 1.8–7.4)
NEUTROPHILS NFR BLD AUTO: 75.6 % — SIGNIFICANT CHANGE UP (ref 43–77)
NRBC # BLD: 0 /100 WBCS — SIGNIFICANT CHANGE UP (ref 0–0)
PHOSPHATE SERPL-MCNC: 4.9 MG/DL — HIGH (ref 2.5–4.5)
PLATELET # BLD AUTO: 381 K/UL — SIGNIFICANT CHANGE UP (ref 150–400)
POTASSIUM SERPL-MCNC: 3.7 MMOL/L — SIGNIFICANT CHANGE UP (ref 3.5–5.3)
POTASSIUM SERPL-SCNC: 3.7 MMOL/L — SIGNIFICANT CHANGE UP (ref 3.5–5.3)
PROT SERPL-MCNC: 6.2 G/DL — SIGNIFICANT CHANGE UP (ref 6–8.3)
RBC # BLD: 3.77 M/UL — LOW (ref 3.8–5.2)
RBC # FLD: 16.3 % — HIGH (ref 10.3–14.5)
SODIUM SERPL-SCNC: 139 MMOL/L — SIGNIFICANT CHANGE UP (ref 135–145)
WBC # BLD: 8.07 K/UL — SIGNIFICANT CHANGE UP (ref 3.8–10.5)
WBC # FLD AUTO: 8.07 K/UL — SIGNIFICANT CHANGE UP (ref 3.8–10.5)

## 2024-03-06 PROCEDURE — 84100 ASSAY OF PHOSPHORUS: CPT

## 2024-03-06 PROCEDURE — 83605 ASSAY OF LACTIC ACID: CPT

## 2024-03-06 PROCEDURE — 83690 ASSAY OF LIPASE: CPT

## 2024-03-06 PROCEDURE — 80053 COMPREHEN METABOLIC PANEL: CPT

## 2024-03-06 PROCEDURE — 81003 URINALYSIS AUTO W/O SCOPE: CPT

## 2024-03-06 PROCEDURE — 85025 COMPLETE CBC W/AUTO DIFF WBC: CPT

## 2024-03-06 PROCEDURE — 86803 HEPATITIS C AB TEST: CPT

## 2024-03-06 PROCEDURE — 80048 BASIC METABOLIC PNL TOTAL CA: CPT

## 2024-03-06 PROCEDURE — 85027 COMPLETE CBC AUTOMATED: CPT

## 2024-03-06 PROCEDURE — 93005 ELECTROCARDIOGRAM TRACING: CPT

## 2024-03-06 PROCEDURE — 83735 ASSAY OF MAGNESIUM: CPT

## 2024-03-06 PROCEDURE — 99285 EMERGENCY DEPT VISIT HI MDM: CPT

## 2024-03-06 PROCEDURE — 36415 COLL VENOUS BLD VENIPUNCTURE: CPT

## 2024-03-06 PROCEDURE — 74177 CT ABD & PELVIS W/CONTRAST: CPT | Mod: MC

## 2024-03-06 PROCEDURE — 99239 HOSP IP/OBS DSCHRG MGMT >30: CPT

## 2024-03-06 RX ORDER — NALOXEGOL OXALATE 12.5 MG/1
1 TABLET, FILM COATED ORAL
Qty: 30 | Refills: 0
Start: 2024-03-06 | End: 2024-04-04

## 2024-03-06 RX ADMIN — PANTOPRAZOLE SODIUM 40 MILLIGRAM(S): 20 TABLET, DELAYED RELEASE ORAL at 05:25

## 2024-03-06 RX ADMIN — Medication 5 MILLIGRAM(S): at 05:25

## 2024-03-06 RX ADMIN — Medication 10 MILLIGRAM(S): at 11:31

## 2024-03-06 RX ADMIN — NALOXEGOL OXALATE 25 MILLIGRAM(S): 12.5 TABLET, FILM COATED ORAL at 11:31

## 2024-03-06 RX ADMIN — Medication 81 MILLIGRAM(S): at 11:30

## 2024-03-06 RX ADMIN — GABAPENTIN 100 MILLIGRAM(S): 400 CAPSULE ORAL at 05:26

## 2024-03-06 RX ADMIN — TACROLIMUS 1.5 MILLIGRAM(S): 5 CAPSULE ORAL at 08:07

## 2024-03-06 RX ADMIN — MYCOPHENOLATE MOFETIL 500 MILLIGRAM(S): 250 CAPSULE ORAL at 05:25

## 2024-03-06 RX ADMIN — Medication 25 MILLIGRAM(S): at 05:25

## 2024-03-06 RX ADMIN — HEPARIN SODIUM 5000 UNIT(S): 5000 INJECTION INTRAVENOUS; SUBCUTANEOUS at 05:25

## 2024-03-06 NOTE — PROGRESS NOTE ADULT - SUBJECTIVE AND OBJECTIVE BOX
Patient is a 73y old  Female who presents with a chief complaint of obstipation (06 Mar 2024 08:43)    INTERVAL HPI/OVERNIGHT EVENTS:  No acute events overnight.  This AM, patient was in the bathroom was     MEDICATIONS  (STANDING):  aspirin  chewable 81 milliGRAM(s) Oral daily  atorvastatin 20 milliGRAM(s) Oral at bedtime  bisacodyl 5 milliGRAM(s) Oral every 12 hours  gabapentin 100 milliGRAM(s) Oral every 12 hours  heparin   Injectable 5000 Unit(s) SubCutaneous every 8 hours  metoprolol succinate ER 25 milliGRAM(s) Oral daily  mycophenolate mofetil 500 milliGRAM(s) Oral two times a day  naloxegol 25 milliGRAM(s) Oral daily  pantoprazole    Tablet 40 milliGRAM(s) Oral before breakfast  PARoxetine 10 milliGRAM(s) Oral daily  senna 2 Tablet(s) Oral at bedtime  tacrolimus 2 milliGRAM(s) Oral at bedtime  tacrolimus 1.5 milliGRAM(s) Oral with breakfast    MEDICATIONS  (PRN):  acetaminophen     Tablet .. 650 milliGRAM(s) Oral every 6 hours PRN Temp greater or equal to 38C (100.4F), Mild Pain (1 - 3)  ALPRAZolam 0.25 milliGRAM(s) Oral daily PRN anxiety  aluminum hydroxide/magnesium hydroxide/simethicone Suspension 30 milliLiter(s) Oral every 4 hours PRN Dyspepsia  benzocaine/menthol Lozenge 1 Lozenge Oral every 3 hours PRN Sore Throat  melatonin 3 milliGRAM(s) Oral at bedtime PRN Insomnia  ondansetron Injectable 4 milliGRAM(s) IV Push every 8 hours PRN Nausea and/or Vomiting  traMADol 50 milliGRAM(s) Oral every 6 hours PRN Severe Pain (7 - 10)      Allergies    No Known Allergies    Intolerances        ROS as above and reviewed and is otherwise negative        PHYSICAL EXAM:  Vital Signs Last 24 Hrs  T(C): 36.8 (06 Mar 2024 07:30), Max: 36.8 (05 Mar 2024 15:00)  T(F): 98.2 (06 Mar 2024 07:30), Max: 98.3 (05 Mar 2024 15:00)  HR: 97 (06 Mar 2024 07:30) (94 - 101)  BP: 147/86 (06 Mar 2024 07:30) (129/84 - 147/86)  BP(mean): --  RR: 16 (06 Mar 2024 07:30) (16 - 16)  SpO2: 93% (06 Mar 2024 07:30) (93% - 95%)    Parameters below as of 06 Mar 2024 07:30  Patient On (Oxygen Delivery Method): room air        GENERAL:     age appropriate, in NAD  HEAD:     atraumatic, normocephalic  EYES:     EOMI, conjunctiva and sclera clear  RESPIRATORY:     clear to auscultation bilaterally, no rales or rhonchi or wheezing or rubs  CARDIOVASCULAR:     regular rate and rhythm, no murmurs or rubs or gallops  GASTROINTESTINAL:     soft, nontender, nondistended, bowel sounds present  EXTREMITIES:     no clubbing or cyanosis or edema  MUSCULOSKELETAL:     no joint pain or swelling or deformities  NERVOUS SYSTEM:     motor strength intact with 5/5 B/L upper and lower extremities, no gross sensory deficits  PSYCH:     appropriate, alert and orientated x3, good concentration      LABS:                        10.5   8.07  )-----------( 381      ( 06 Mar 2024 06:30 )             33.9     06 Mar 2024 06:30    139    |  101    |  10     ----------------------------<  91     3.7     |  28     |  0.78     Ca    9.1        06 Mar 2024 06:30  Phos  4.9       06 Mar 2024 06:30  Mg     1.7       06 Mar 2024 06:30    TPro  6.2    /  Alb  2.5    /  TBili  0.4    /  DBili  x      /  AST  28     /  ALT  23     /  AlkPhos  65     06 Mar 2024 06:30      Urinalysis Basic - ( 06 Mar 2024 06:30 )    Color: x / Appearance: x / SG: x / pH: x  Gluc: 91 mg/dL / Ketone: x  / Bili: x / Urobili: x   Blood: x / Protein: x / Nitrite: x   Leuk Esterase: x / RBC: x / WBC x   Sq Epi: x / Non Sq Epi: x / Bacteria: x      CAPILLARY BLOOD GLUCOSE           Patient is a 73y old  Female who presents with a chief complaint of obstipation (06 Mar 2024 08:43)    INTERVAL HPI/OVERNIGHT EVENTS:  No acute events overnight.  This AM, patient was in the bathroom having a BM.  Later, patient seen resting in her bed.  Said she feels improved.  Abdominal pain also improved.  Still has the chronic back pain but no acute symptoms.  Denies any nausea/vomiting.  Ready to go home.      MEDICATIONS  (STANDING):  aspirin  chewable 81 milliGRAM(s) Oral daily  atorvastatin 20 milliGRAM(s) Oral at bedtime  bisacodyl 5 milliGRAM(s) Oral every 12 hours  gabapentin 100 milliGRAM(s) Oral every 12 hours  heparin   Injectable 5000 Unit(s) SubCutaneous every 8 hours  metoprolol succinate ER 25 milliGRAM(s) Oral daily  mycophenolate mofetil 500 milliGRAM(s) Oral two times a day  naloxegol 25 milliGRAM(s) Oral daily  pantoprazole    Tablet 40 milliGRAM(s) Oral before breakfast  PARoxetine 10 milliGRAM(s) Oral daily  senna 2 Tablet(s) Oral at bedtime  tacrolimus 2 milliGRAM(s) Oral at bedtime  tacrolimus 1.5 milliGRAM(s) Oral with breakfast    MEDICATIONS  (PRN):  acetaminophen     Tablet .. 650 milliGRAM(s) Oral every 6 hours PRN Temp greater or equal to 38C (100.4F), Mild Pain (1 - 3)  ALPRAZolam 0.25 milliGRAM(s) Oral daily PRN anxiety  aluminum hydroxide/magnesium hydroxide/simethicone Suspension 30 milliLiter(s) Oral every 4 hours PRN Dyspepsia  benzocaine/menthol Lozenge 1 Lozenge Oral every 3 hours PRN Sore Throat  melatonin 3 milliGRAM(s) Oral at bedtime PRN Insomnia  ondansetron Injectable 4 milliGRAM(s) IV Push every 8 hours PRN Nausea and/or Vomiting  traMADol 50 milliGRAM(s) Oral every 6 hours PRN Severe Pain (7 - 10)      Allergies  No Known Allergies    ROS as above and reviewed and is otherwise negative    PHYSICAL EXAM:  Vital Signs Last 24 Hrs  T(C): 36.8 (06 Mar 2024 07:30), Max: 36.8 (05 Mar 2024 15:00)  T(F): 98.2 (06 Mar 2024 07:30), Max: 98.3 (05 Mar 2024 15:00)  HR: 97 (06 Mar 2024 07:30) (94 - 101)  BP: 147/86 (06 Mar 2024 07:30) (129/84 - 147/86)  BP(mean): --  RR: 16 (06 Mar 2024 07:30) (16 - 16)  SpO2: 93% (06 Mar 2024 07:30) (93% - 95%)    Parameters below as of 06 Mar 2024 07:30  Patient On (Oxygen Delivery Method): room air    GENERAL:     well-appearing female, resting in bed, in NAD  HEAD:     atraumatic, normocephalic  EYES:     EOMI, conjunctiva and sclera clear  RESPIRATORY:     clear to auscultation bilaterally, no rales or rhonchi or wheezing or rubs  CARDIOVASCULAR:     regular rate and rhythm, no murmurs or rubs or gallops  GASTROINTESTINAL:     soft, still not tender to palpation, nondistended, bowel sounds present  EXTREMITIES:     no clubbing or cyanosis or edema  MUSCULOSKELETAL:     no joint pain or swelling or deformities  BACK:  scant drainage in JOHN drain  NERVOUS SYSTEM:     no sensory or motor deficits noted in BLE  PSYCH:     appropriate, alert and orientated x3, good concentration      LABS:                        10.5   8.07  )-----------( 381      ( 06 Mar 2024 06:30 )             33.9     06 Mar 2024 06:30    139    |  101    |  10     ----------------------------<  91     3.7     |  28     |  0.78     Ca    9.1        06 Mar 2024 06:30  Phos  4.9       06 Mar 2024 06:30  Mg     1.7       06 Mar 2024 06:30    TPro  6.2    /  Alb  2.5    /  TBili  0.4    /  DBili  x      /  AST  28     /  ALT  23     /  AlkPhos  65     06 Mar 2024 06:30      Urinalysis Basic - ( 06 Mar 2024 06:30 )    Color: x / Appearance: x / SG: x / pH: x  Gluc: 91 mg/dL / Ketone: x  / Bili: x / Urobili: x   Blood: x / Protein: x / Nitrite: x   Leuk Esterase: x / RBC: x / WBC x   Sq Epi: x / Non Sq Epi: x / Bacteria: x      CAPILLARY BLOOD GLUCOSE

## 2024-03-06 NOTE — DISCHARGE NOTE PROVIDER - NSDCMRMEDTOKEN_GEN_ALL_CORE_FT
ALPRAZolam 0.25 mg oral tablet: 1 tab(s) orally once a day  aspirin 81 mg oral delayed release capsule: orally once a day  atorvastatin 20 mg oral tablet: 1 tab(s) orally once a day  Cellcept: 250mg po 2 tabs (500mg) BID for a total of 1000mg in a day  CellCept 500 mg oral tablet: 2 tab(s) orally 2 times a day  Citracal + D 315 mg-6.25 mcg (250 intl units) oral tablet: 2 tab(s) orally every 12 hours  Colace 100 mg oral capsule: 1 cap(s) orally 3 times a day  gabapentin 100 mg oral capsule: 1 cap(s) orally every 12 hours  lansoprazole 15 mg oral delayed release capsule: 1 cap(s) orally once a day  methocarbamol 500 mg oral tablet: 2 tab(s) orally every 8 hours  Metoprolol Succinate ER 25 mg oral tablet, extended release: 1 tab(s) orally once a day  Movantik 25 mg oral tablet: 1 tab(s) orally once a day  PARoxetine 10 mg oral tablet: 1 tab(s) orally once a day  polyethylene glycol 3350 oral kit: 2 2 times a day  senna (sennosides) 8.6 mg oral tablet: 2 tab(s) orally 2 times a day  tacrolimus 0.5 mg oral capsule: 3 cap(s) orally once a day (at bedtime)  tacrolimus 1 mg oral capsule: 2 cap(s) orally once a day in AM  traMADol 50 mg oral tablet: 1 tab(s) orally every 4 hours

## 2024-03-06 NOTE — DISCHARGE NOTE PROVIDER - HOSPITAL COURSE
Patient admitted on 3/2/2024 for abdominal distension and pain, associated with constipation.  Patient had recent spine surgery and patient said she was having trouble passing gas and moving her bowels 48hrs prior to arrival here.   CT in the ED showed "a moderate amount of stool throughout the colon, especially right colon and distal/terminal ileum. Dilated fecalized distal/terminal ileum with mild surrounding stranding, which may be due to incompetent ileocecal valve and reflux of cecal contents. No discrete transition to suggest   bowel obstruction.  Borderline dilated proximal/mid appendix tapering distally without significant inflammatory change. Recommend clinical correlation."  GI and surgery were both consulted.  It was thought that dilated appendix seen on CT was likely related to the ileus.  GI recommended movantik for opiate-indicued constipation.  Patient started to have BMs and eventually her abdominal pain improved.  ID was also consulted for elevated WBC but was thought to be reactive and not an actually infection (did downtrend).  There was also concern of leakage around her drain in her back.  However, patient reports that it has been leaking prior to arrival here and reported that to her surgeon whom recommended followup for removal and further management (has appointment this Friday on 3/8).

## 2024-03-06 NOTE — PROGRESS NOTE ADULT - SUBJECTIVE AND OBJECTIVE BOX
INTERVAL HPI/OVERNIGHT EVENTS:  No new overnight event.  No N/V/D.  Tolerating diet.  havinf bm    Allergies    No Known Allergies    Intolerances    General:  No wt loss, fevers, chills, night sweats, fatigue,   Eyes:  Good vision, no reported pain  ENT:  No sore throat, pain, runny nose, dysphagia  CV:  No pain, palpitations, hypo/hypertension  Resp:  No dyspnea, cough, tachypnea, wheezing  GI:  No pain, No nausea, No vomiting, No diarrhea, No constipation, No weight loss, No fever, No pruritis, No rectal bleeding, No tarry stools, No dysphagia,  :  No pain, bleeding, incontinence, nocturia  Muscle:  No pain, weakness  Neuro:  No weakness, tingling, memory problems  Psych:  No fatigue, insomnia, mood problems, depression  Endocrine:  No polyuria, polydipsia, cold/heat intolerance  Heme:  No petechiae, ecchymosis, easy bruisability  Skin:  No rash, tattoos, scars, edema      PHYSICAL EXAM:   Vital Signs:  Vital Signs Last 24 Hrs  T(C): 36.8 (06 Mar 2024 07:30), Max: 36.8 (05 Mar 2024 15:00)  T(F): 98.2 (06 Mar 2024 07:30), Max: 98.3 (05 Mar 2024 15:00)  HR: 97 (06 Mar 2024 07:30) (94 - 101)  BP: 147/86 (06 Mar 2024 07:30) (129/84 - 147/86)  BP(mean): --  RR: 16 (06 Mar 2024 07:30) (16 - 16)  SpO2: 93% (06 Mar 2024 07:30) (93% - 95%)    Parameters below as of 06 Mar 2024 07:30  Patient On (Oxygen Delivery Method): room air      Daily     Daily I&O's Summary    05 Mar 2024 07:01  -  06 Mar 2024 07:00  --------------------------------------------------------  IN: 0 mL / OUT: 0 mL / NET: 0 mL        GENERAL:  Appears stated age, well-groomed, well-nourished, no distress  HEENT:  NC/AT,  conjunctivae clear and pink, no thyromegaly, nodules, adenopathy, no JVD, sclera -anicteric  CHEST:  Full & symmetric excursion, no increased effort, breath sounds clear  HEART:  Regular rhythm, S1, S2, no murmur/rub/S3/S4, no abdominal bruit, no edema  ABDOMEN:  Soft, non-tender, non-distended, normoactive bowel sounds,  no masses ,no hepato-splenomegaly, no signs of chronic liver disease  EXTEREMITIES:  no cyanosis,clubbing or edema  SKIN:  No rash/erythema/ecchymoses/petechiae/wounds/abscess/warm/dry  NEURO:  Alert, oriented, no asterixis, no tremor, no encephalopathy      LABS:                        10.5   8.07  )-----------( 381      ( 06 Mar 2024 06:30 )             33.9     03-05    138  |  100  |  9   ----------------------------<  90  3.2<L>   |  30  |  0.79    Ca    9.0      05 Mar 2024 08:17  Phos  3.3     03-05  Mg     1.8     03-05        Urinalysis Basic - ( 05 Mar 2024 08:17 )    Color: x / Appearance: x / SG: x / pH: x  Gluc: 90 mg/dL / Ketone: x  / Bili: x / Urobili: x   Blood: x / Protein: x / Nitrite: x   Leuk Esterase: x / RBC: x / WBC x   Sq Epi: x / Non Sq Epi: x / Bacteria: x      amylase   lipaseLipase: 13 U/L (03-01 @ 18:50)    RADIOLOGY & ADDITIONAL TESTS:

## 2024-03-06 NOTE — DISCHARGE NOTE PROVIDER - CARE PROVIDER_API CALL
Tushar Kebede  Gastroenterology  [address] [place] [address]  Phone: [phone]  Fax: [phone]  Follow Up Time:     TAMARA WARREN  [facility] [place] [address]  Phone: [phone]  Fax: [phone]  Follow Up Time:

## 2024-03-06 NOTE — DISCHARGE NOTE NURSING/CASE MANAGEMENT/SOCIAL WORK - PATIENT PORTAL LINK FT
You can access the FollowMyHealth Patient Portal offered by [facility] by registering at the following website: [email or web address]. By joining [employer]’s FollowMyHealth portal, you will also be able to view your health information using other applications (apps) compatible with our system.

## 2024-03-06 NOTE — PROGRESS NOTE ADULT - ASSESSMENT
constipation likely opioid induced    PLAN  reg diet  continue movantik  oob as tolerated  continue bowel regimen  continue PO as tolerated  will follow

## 2024-03-06 NOTE — PROGRESS NOTE ADULT - PROVIDER SPECIALTY LIST ADULT
Hospitalist
Hospitalist
Infectious Disease
Infectious Disease
Gastroenterology
Gastroenterology
Hospitalist
Infectious Disease
Gastroenterology
Gastroenterology
Hospitalist

## 2024-03-06 NOTE — DISCHARGE NOTE PROVIDER - NSDCCPCAREPLAN_GEN_ALL_CORE_FT
PRINCIPAL DISCHARGE DIAGNOSIS  Diagnosis: Acute constipation  Assessment and Plan of Treatment: ******  Please continue taking Movantik 25mg daily while you are taking pain medications.  If you are having excessive bowel movements or worsening abdominal cramping/pain, you can lessen your bowel regiments.   ******      SECONDARY DISCHARGE DIAGNOSES  Diagnosis: H/O lumbosacral spine surgery  Assessment and Plan of Treatment: ******  Please follow up with your surgeon regarding your drain and possible leakage.    ******

## 2024-03-06 NOTE — PROGRESS NOTE ADULT - ASSESSMENT
Obstipation likely due to opioid induced constipation -prior to admission, last reported BM was prior to spinal fusion procedure on 2/21, CT A/P with heavy stool burden, had at least one small BM on 3/3  - improving -> continue with movantik and other BM regiments   - GI following   - minimize narcotics   - was taking tramadol 50 mg q4h PRN at home post surgery  - C/w dulcolax and senna  - advance diet as tolerated     Leukocytosis - improving  - could contribute from her constipation  - given current immune status was placed on zosyn, DCed 3/2  - sx consult -> for possible appendicitis appreciated, dilated appendix on CT likely related to ileus  - improving    s/p heart transplant 23ya   - c/w cellcept 500 mg bid, C/w tacrolimus 1.5 mg qam and 2 mg qpm    HTN:  -C/w metoprolol succinate 25 mg qd    HLD:  -C/w lipitor 20 mg qd     anxiety:  -C/w paxil 10 mg qd  -xanax 0.25 mg daily PRN     s/p spinal fusion with drain  - f/u with surgeon at [facility] -> has f/u this Friday 3/8 for dressing change and for drain to be dc'ed  - having back pain this AM 3/3 -> ordered IV Acet x 1    VTE PPx - Heparin SC

## 2024-03-06 NOTE — PROGRESS NOTE ADULT - REASON FOR ADMISSION
obstipation

## 2024-05-14 ENCOUNTER — NON-APPOINTMENT (OUTPATIENT)
Age: 74
End: 2024-05-14

## 2025-08-12 ENCOUNTER — EMERGENCY (EMERGENCY)
Facility: HOSPITAL | Age: 75
LOS: 1 days | End: 2025-08-12
Attending: EMERGENCY MEDICINE | Admitting: EMERGENCY MEDICINE
Payer: MEDICARE

## 2025-08-12 VITALS
DIASTOLIC BLOOD PRESSURE: 93 MMHG | HEART RATE: 95 BPM | RESPIRATION RATE: 16 BRPM | OXYGEN SATURATION: 95 % | SYSTOLIC BLOOD PRESSURE: 149 MMHG | TEMPERATURE: 98 F

## 2025-08-12 VITALS
SYSTOLIC BLOOD PRESSURE: 163 MMHG | RESPIRATION RATE: 15 BRPM | TEMPERATURE: 98 F | HEIGHT: 65 IN | DIASTOLIC BLOOD PRESSURE: 97 MMHG | WEIGHT: 205.03 LBS | OXYGEN SATURATION: 99 % | HEART RATE: 100 BPM

## 2025-08-12 DIAGNOSIS — Z94.1 HEART TRANSPLANT STATUS: Chronic | ICD-10-CM

## 2025-08-12 DIAGNOSIS — Z98.1 ARTHRODESIS STATUS: Chronic | ICD-10-CM

## 2025-08-12 PROCEDURE — 73030 X-RAY EXAM OF SHOULDER: CPT

## 2025-08-12 PROCEDURE — 73070 X-RAY EXAM OF ELBOW: CPT | Mod: 26,RT

## 2025-08-12 PROCEDURE — 99284 EMERGENCY DEPT VISIT MOD MDM: CPT | Mod: 25

## 2025-08-12 PROCEDURE — 99284 EMERGENCY DEPT VISIT MOD MDM: CPT | Mod: FS

## 2025-08-12 PROCEDURE — 73502 X-RAY EXAM HIP UNI 2-3 VIEWS: CPT

## 2025-08-12 PROCEDURE — 73030 X-RAY EXAM OF SHOULDER: CPT | Mod: 26,RT

## 2025-08-12 PROCEDURE — 73502 X-RAY EXAM HIP UNI 2-3 VIEWS: CPT | Mod: 26,RT

## 2025-08-12 PROCEDURE — 73070 X-RAY EXAM OF ELBOW: CPT

## 2025-08-12 PROCEDURE — 24500 CLTX HUMRL SHFT FX W/O MNPJ: CPT | Mod: RT

## 2025-08-12 PROCEDURE — 73060 X-RAY EXAM OF HUMERUS: CPT

## 2025-08-12 PROCEDURE — 73060 X-RAY EXAM OF HUMERUS: CPT | Mod: 26,RT

## 2025-08-12 RX ORDER — OXYCODONE HYDROCHLORIDE 30 MG/1
1 TABLET ORAL
Qty: 12 | Refills: 0
Start: 2025-08-12 | End: 2025-08-14

## 2025-08-12 RX ORDER — OXYCODONE HYDROCHLORIDE 30 MG/1
5 TABLET ORAL ONCE
Refills: 0 | Status: DISCONTINUED | OUTPATIENT
Start: 2025-08-12 | End: 2025-08-12

## 2025-08-12 RX ADMIN — OXYCODONE HYDROCHLORIDE 5 MILLIGRAM(S): 30 TABLET ORAL at 12:45

## 2025-08-13 ENCOUNTER — EMERGENCY (EMERGENCY)
Facility: HOSPITAL | Age: 75
LOS: 1 days | End: 2025-08-13
Attending: EMERGENCY MEDICINE | Admitting: EMERGENCY MEDICINE
Payer: MEDICARE

## 2025-08-13 VITALS
SYSTOLIC BLOOD PRESSURE: 136 MMHG | HEART RATE: 118 BPM | OXYGEN SATURATION: 98 % | TEMPERATURE: 98 F | RESPIRATION RATE: 18 BRPM | HEIGHT: 65 IN | DIASTOLIC BLOOD PRESSURE: 85 MMHG | WEIGHT: 205.03 LBS

## 2025-08-13 VITALS
SYSTOLIC BLOOD PRESSURE: 144 MMHG | HEART RATE: 86 BPM | OXYGEN SATURATION: 99 % | TEMPERATURE: 98 F | DIASTOLIC BLOOD PRESSURE: 82 MMHG | RESPIRATION RATE: 18 BRPM

## 2025-08-13 DIAGNOSIS — Z94.1 HEART TRANSPLANT STATUS: Chronic | ICD-10-CM

## 2025-08-13 DIAGNOSIS — Z98.1 ARTHRODESIS STATUS: Chronic | ICD-10-CM

## 2025-08-13 PROBLEM — Z78.9 OTHER SPECIFIED HEALTH STATUS: Chronic | Status: ACTIVE | Noted: 2025-08-12

## 2025-08-13 PROCEDURE — 73110 X-RAY EXAM OF WRIST: CPT | Mod: 26,RT

## 2025-08-13 PROCEDURE — 99285 EMERGENCY DEPT VISIT HI MDM: CPT

## 2025-08-13 PROCEDURE — 73110 X-RAY EXAM OF WRIST: CPT

## 2025-08-13 PROCEDURE — 96374 THER/PROPH/DIAG INJ IV PUSH: CPT

## 2025-08-13 PROCEDURE — 99284 EMERGENCY DEPT VISIT MOD MDM: CPT | Mod: 25

## 2025-08-13 PROCEDURE — 73090 X-RAY EXAM OF FOREARM: CPT

## 2025-08-13 PROCEDURE — 96375 TX/PRO/DX INJ NEW DRUG ADDON: CPT

## 2025-08-13 PROCEDURE — 73090 X-RAY EXAM OF FOREARM: CPT | Mod: 26,LT

## 2025-08-13 RX ORDER — ACETAMINOPHEN 500 MG/5ML
1000 LIQUID (ML) ORAL ONCE
Refills: 0 | Status: COMPLETED | OUTPATIENT
Start: 2025-08-13 | End: 2025-08-13

## 2025-08-13 RX ORDER — KETOROLAC TROMETHAMINE 30 MG/ML
15 INJECTION, SOLUTION INTRAMUSCULAR; INTRAVENOUS ONCE
Refills: 0 | Status: DISCONTINUED | OUTPATIENT
Start: 2025-08-13 | End: 2025-08-13

## 2025-08-13 RX ORDER — HYDROMORPHONE/SOD CHLOR,ISO/PF 2 MG/10 ML
0.5 SYRINGE (ML) INJECTION ONCE
Refills: 0 | Status: DISCONTINUED | OUTPATIENT
Start: 2025-08-13 | End: 2025-08-13

## 2025-08-13 RX ORDER — DEXAMETHASONE 0.5 MG/1
10 TABLET ORAL ONCE
Refills: 0 | Status: COMPLETED | OUTPATIENT
Start: 2025-08-13 | End: 2025-08-13

## 2025-08-13 RX ADMIN — Medication 1000 MILLILITER(S): at 16:30

## 2025-08-13 RX ADMIN — Medication 0.5 MILLIGRAM(S): at 16:30

## 2025-08-13 RX ADMIN — Medication 2 MILLIGRAM(S): at 17:38

## 2025-08-13 RX ADMIN — DEXAMETHASONE 102 MILLIGRAM(S): 0.5 TABLET ORAL at 17:38

## 2025-08-13 RX ADMIN — Medication 400 MILLIGRAM(S): at 17:38

## 2025-08-13 RX ADMIN — Medication 0.5 MILLIGRAM(S): at 17:00
